# Patient Record
Sex: MALE | Race: BLACK OR AFRICAN AMERICAN | NOT HISPANIC OR LATINO | Employment: OTHER | URBAN - METROPOLITAN AREA
[De-identification: names, ages, dates, MRNs, and addresses within clinical notes are randomized per-mention and may not be internally consistent; named-entity substitution may affect disease eponyms.]

---

## 2023-04-06 LAB
ANION GAP IN SER/PLAS: 8 MMOL/L (ref 10–20)
APPEARANCE, URINE: CLEAR
BILIRUBIN, URINE: NEGATIVE
BLOOD, URINE: NEGATIVE
CALCIUM (MG/DL) IN SER/PLAS: 9.9 MG/DL (ref 8.6–10.3)
CARBON DIOXIDE, TOTAL (MMOL/L) IN SER/PLAS: 29 MMOL/L (ref 21–32)
CHLORIDE (MMOL/L) IN SER/PLAS: 106 MMOL/L (ref 98–107)
COLOR, URINE: YELLOW
CREATININE (MG/DL) IN SER/PLAS: 1.24 MG/DL (ref 0.5–1.3)
CREATININE (MG/DL) IN URINE: 263 MG/DL (ref 20–370)
GFR MALE: 64 ML/MIN/1.73M2
GLUCOSE (MG/DL) IN SER/PLAS: 69 MG/DL (ref 74–99)
GLUCOSE, URINE: NEGATIVE MG/DL
KETONES, URINE: NEGATIVE MG/DL
LEUKOCYTE ESTERASE, URINE: NEGATIVE
NITRITE, URINE: NEGATIVE
PH, URINE: 5 (ref 5–8)
POTASSIUM (MMOL/L) IN SER/PLAS: 4.5 MMOL/L (ref 3.5–5.3)
PROTEIN (MG/DL) IN URINE: 15 MG/DL (ref 5–25)
PROTEIN, URINE: NEGATIVE MG/DL
PROTEIN/CREATININE (MG/MG) IN URINE: 0.06 MG/MG CREAT (ref 0–0.17)
SODIUM (MMOL/L) IN SER/PLAS: 138 MMOL/L (ref 136–145)
SPECIFIC GRAVITY, URINE: 1.03 (ref 1–1.03)
UREA NITROGEN (MG/DL) IN SER/PLAS: 13 MG/DL (ref 6–23)
UROBILINOGEN, URINE: 2 MG/DL (ref 0–1.9)

## 2023-08-25 PROBLEM — R93.1 ELEVATED CORONARY ARTERY CALCIUM SCORE: Status: ACTIVE | Noted: 2023-08-25

## 2023-08-25 PROBLEM — E78.5 DYSLIPIDEMIA: Status: ACTIVE | Noted: 2023-08-25

## 2023-08-25 PROBLEM — I25.10 CAD (CORONARY ARTERY DISEASE): Status: ACTIVE | Noted: 2023-08-25

## 2023-08-25 PROBLEM — N39.0 UTI (URINARY TRACT INFECTION): Status: ACTIVE | Noted: 2023-08-25

## 2023-08-25 PROBLEM — N18.9 CKD (CHRONIC KIDNEY DISEASE): Status: ACTIVE | Noted: 2023-08-25

## 2023-08-25 PROBLEM — R06.02 SHORTNESS OF BREATH ON EXERTION: Status: ACTIVE | Noted: 2023-08-25

## 2023-08-25 PROBLEM — I51.7 LVH (LEFT VENTRICULAR HYPERTROPHY): Status: ACTIVE | Noted: 2023-08-25

## 2023-08-25 PROBLEM — R60.0 LEG EDEMA: Status: ACTIVE | Noted: 2023-08-25

## 2023-08-25 PROBLEM — I10 HYPERTENSION, BENIGN: Status: ACTIVE | Noted: 2023-08-25

## 2023-08-25 PROBLEM — K56.41 FECAL IMPACTION OF COLON (MULTI): Status: ACTIVE | Noted: 2023-08-25

## 2023-08-25 PROBLEM — R32 URINARY INCONTINENCE: Status: ACTIVE | Noted: 2023-08-25

## 2023-08-25 PROBLEM — N45.3 EPIDIDYMOORCHITIS: Status: ACTIVE | Noted: 2023-08-25

## 2023-08-25 PROBLEM — R94.39 ABNORMAL STRESS TEST: Status: ACTIVE | Noted: 2023-08-25

## 2023-08-25 PROBLEM — I48.91 LONE ATRIAL FIBRILLATION (MULTI): Status: ACTIVE | Noted: 2023-08-25

## 2023-08-25 PROBLEM — N31.8 FREQUENCY-URGENCY SYNDROME: Status: ACTIVE | Noted: 2023-08-25

## 2023-08-25 PROBLEM — N40.0 BPH (BENIGN PROSTATIC HYPERPLASIA): Status: ACTIVE | Noted: 2023-08-25

## 2023-08-25 PROBLEM — G47.33 OSA ON CPAP: Status: ACTIVE | Noted: 2023-08-25

## 2023-08-25 RX ORDER — GABAPENTIN 600 MG/1
600 TABLET ORAL 3 TIMES DAILY
COMMUNITY

## 2023-08-25 RX ORDER — LAMOTRIGINE 200 MG/1
1 TABLET ORAL DAILY
COMMUNITY
Start: 2017-04-05

## 2023-08-25 RX ORDER — METFORMIN HYDROCHLORIDE 1000 MG/1
1 TABLET ORAL
COMMUNITY
Start: 2017-12-18

## 2023-08-25 RX ORDER — MIRTAZAPINE 30 MG/1
1 TABLET, FILM COATED ORAL NIGHTLY
COMMUNITY
Start: 2020-12-01

## 2023-08-25 RX ORDER — CLONIDINE HYDROCHLORIDE 0.1 MG/1
0.1 TABLET ORAL 2 TIMES DAILY
COMMUNITY
End: 2023-10-17 | Stop reason: WASHOUT

## 2023-08-25 RX ORDER — NAPROXEN SODIUM 220 MG/1
1 TABLET, FILM COATED ORAL DAILY
COMMUNITY

## 2023-08-25 RX ORDER — OMEPRAZOLE 20 MG/1
1 CAPSULE, DELAYED RELEASE ORAL DAILY
COMMUNITY
Start: 2020-12-01

## 2023-08-25 RX ORDER — INSULIN GLARGINE 100 [IU]/ML
INJECTION, SOLUTION SUBCUTANEOUS
COMMUNITY
End: 2023-10-17 | Stop reason: WASHOUT

## 2023-08-25 RX ORDER — CEPHRADINE 500 MG
CAPSULE ORAL DAILY
COMMUNITY

## 2023-08-25 RX ORDER — HYDROCHLOROTHIAZIDE 25 MG/1
1 TABLET ORAL DAILY
COMMUNITY
End: 2023-10-17 | Stop reason: WASHOUT

## 2023-08-25 RX ORDER — VIT A/VIT C/VIT E/ZINC/COPPER 4296-226
CAPSULE ORAL
COMMUNITY

## 2023-08-25 RX ORDER — SPIRONOLACTONE 25 MG/1
TABLET ORAL 2 TIMES DAILY
COMMUNITY
Start: 2020-12-01 | End: 2023-10-17 | Stop reason: WASHOUT

## 2023-08-25 RX ORDER — HUMAN INSULIN 100 [USP'U]/ML
INJECTION, SUSPENSION SUBCUTANEOUS 2 TIMES DAILY
COMMUNITY
End: 2024-04-17 | Stop reason: WASHOUT

## 2023-08-25 RX ORDER — ATORVASTATIN CALCIUM 80 MG/1
1 TABLET, FILM COATED ORAL NIGHTLY
COMMUNITY
End: 2024-04-17 | Stop reason: SDUPTHER

## 2023-08-25 RX ORDER — FLUTICASONE PROPIONATE 50 MCG
SPRAY, SUSPENSION (ML) NASAL
COMMUNITY
Start: 2021-05-26

## 2023-08-25 RX ORDER — GUANFACINE 2 MG/1
2 TABLET, EXTENDED RELEASE ORAL DAILY
COMMUNITY
End: 2023-10-17 | Stop reason: WASHOUT

## 2023-08-25 RX ORDER — ARTIFICIAL TEARS 1; 2; 3 MG/ML; MG/ML; MG/ML
SOLUTION/ DROPS OPHTHALMIC 4 TIMES DAILY PRN
COMMUNITY

## 2023-08-25 RX ORDER — DILTIAZEM HYDROCHLORIDE EXTENDED-RELEASE TABLETS 240 MG/1
1 TABLET, EXTENDED RELEASE ORAL DAILY
COMMUNITY
End: 2023-10-17 | Stop reason: WASHOUT

## 2023-08-25 RX ORDER — TIZANIDINE 4 MG/1
TABLET ORAL EVERY 8 HOURS PRN
COMMUNITY

## 2023-10-04 ENCOUNTER — APPOINTMENT (OUTPATIENT)
Dept: CARDIOLOGY | Facility: CLINIC | Age: 68
End: 2023-10-04
Payer: COMMERCIAL

## 2023-10-11 ENCOUNTER — HOSPITAL ENCOUNTER (OUTPATIENT)
Dept: CARDIOLOGY | Facility: HOSPITAL | Age: 68
Discharge: HOME | End: 2023-10-11
Payer: COMMERCIAL

## 2023-10-11 DIAGNOSIS — I48.91 ATRIAL FIBRILLATION, UNSPECIFIED TYPE (MULTI): ICD-10-CM

## 2023-10-11 DIAGNOSIS — I48.91 UNSPECIFIED ATRIAL FIBRILLATION (MULTI): ICD-10-CM

## 2023-10-11 DIAGNOSIS — Z95.818 IMPLANTABLE LOOP RECORDER PRESENT: Primary | ICD-10-CM

## 2023-10-11 PROCEDURE — 93298 REM INTERROG DEV EVAL SCRMS: CPT | Performed by: INTERNAL MEDICINE

## 2023-10-13 ENCOUNTER — LAB (OUTPATIENT)
Dept: LAB | Facility: LAB | Age: 68
End: 2023-10-13
Payer: COMMERCIAL

## 2023-10-13 DIAGNOSIS — N18.32 CHRONIC KIDNEY DISEASE, STAGE 3B (MULTI): Primary | ICD-10-CM

## 2023-10-13 DIAGNOSIS — N18.32 CHRONIC KIDNEY DISEASE, STAGE 3B (MULTI): ICD-10-CM

## 2023-10-13 PROBLEM — K21.9 GASTROESOPHAGEAL REFLUX DISEASE WITHOUT ESOPHAGITIS: Status: ACTIVE | Noted: 2018-10-18

## 2023-10-13 PROBLEM — Z86.010 HISTORY OF COLONIC POLYPS: Status: ACTIVE | Noted: 2018-10-18

## 2023-10-13 PROBLEM — Z86.0100 HISTORY OF COLONIC POLYPS: Status: ACTIVE | Noted: 2018-10-18

## 2023-10-13 LAB
ANION GAP SERPL CALC-SCNC: 14 MMOL/L (ref 10–20)
APPEARANCE UR: CLEAR
BILIRUB UR STRIP.AUTO-MCNC: NEGATIVE MG/DL
BUN SERPL-MCNC: 11 MG/DL (ref 6–23)
CALCIUM SERPL-MCNC: 9.7 MG/DL (ref 8.6–10.3)
CHLORIDE SERPL-SCNC: 101 MMOL/L (ref 98–107)
CO2 SERPL-SCNC: 26 MMOL/L (ref 21–32)
COLOR UR: YELLOW
CREAT SERPL-MCNC: 1.08 MG/DL (ref 0.5–1.3)
CREAT UR-MCNC: 184 MG/DL (ref 20–370)
GFR SERPL CREATININE-BSD FRML MDRD: 75 ML/MIN/1.73M*2
GLUCOSE SERPL-MCNC: 298 MG/DL (ref 74–99)
GLUCOSE UR STRIP.AUTO-MCNC: ABNORMAL MG/DL
HYALINE CASTS #/AREA URNS AUTO: ABNORMAL /LPF
KETONES UR STRIP.AUTO-MCNC: NEGATIVE MG/DL
LEUKOCYTE ESTERASE UR QL STRIP.AUTO: NEGATIVE
MUCOUS THREADS #/AREA URNS AUTO: ABNORMAL /LPF
NITRITE UR QL STRIP.AUTO: NEGATIVE
PH UR STRIP.AUTO: 6 [PH]
POTASSIUM SERPL-SCNC: 5.2 MMOL/L (ref 3.5–5.3)
PROT UR STRIP.AUTO-MCNC: ABNORMAL MG/DL
PROT UR-ACNC: 25 MG/DL (ref 5–25)
PROT/CREAT UR: 0.14 MG/MG CREAT (ref 0–0.17)
RBC # UR STRIP.AUTO: NEGATIVE /UL
RBC #/AREA URNS AUTO: ABNORMAL /HPF
SODIUM SERPL-SCNC: 136 MMOL/L (ref 136–145)
SP GR UR STRIP.AUTO: 1.02
UROBILINOGEN UR STRIP.AUTO-MCNC: 2 MG/DL
WBC #/AREA URNS AUTO: ABNORMAL /HPF

## 2023-10-13 PROCEDURE — 81001 URINALYSIS AUTO W/SCOPE: CPT

## 2023-10-13 PROCEDURE — 82570 ASSAY OF URINE CREATININE: CPT

## 2023-10-13 PROCEDURE — 84156 ASSAY OF PROTEIN URINE: CPT

## 2023-10-13 PROCEDURE — 36415 COLL VENOUS BLD VENIPUNCTURE: CPT

## 2023-10-13 PROCEDURE — 80048 BASIC METABOLIC PNL TOTAL CA: CPT

## 2023-10-13 RX ORDER — GUANFACINE 4 MG/1
TABLET, EXTENDED RELEASE ORAL
COMMUNITY
Start: 2023-09-18

## 2023-10-13 RX ORDER — AMIODARONE HYDROCHLORIDE 200 MG/1
200 TABLET ORAL
COMMUNITY
Start: 2023-10-05 | End: 2023-10-17 | Stop reason: SDUPTHER

## 2023-10-13 RX ORDER — DILTIAZEM HYDROCHLORIDE 180 MG/1
CAPSULE, EXTENDED RELEASE ORAL
COMMUNITY
Start: 2023-10-05 | End: 2023-10-17 | Stop reason: SDUPTHER

## 2023-10-13 RX ORDER — APIXABAN 5 MG/1
5 TABLET, FILM COATED ORAL EVERY 12 HOURS
COMMUNITY
Start: 2023-10-05 | End: 2023-10-17 | Stop reason: SDUPTHER

## 2023-10-13 RX ORDER — GABAPENTIN 300 MG/1
CAPSULE ORAL
COMMUNITY
Start: 2023-01-10 | End: 2023-10-17 | Stop reason: WASHOUT

## 2023-10-13 RX ORDER — CLOPIDOGREL BISULFATE 75 MG/1
75 TABLET ORAL
COMMUNITY
Start: 2023-10-05 | End: 2023-10-17 | Stop reason: WASHOUT

## 2023-10-13 RX ORDER — GLUCOSAM/CHON-MSM1/C/MANG/BOSW 500-416.6
TABLET ORAL
COMMUNITY
Start: 2023-07-28

## 2023-10-13 RX ORDER — INSULIN LISPRO 100 [IU]/ML
INJECTION, SUSPENSION SUBCUTANEOUS
COMMUNITY
End: 2023-10-17 | Stop reason: WASHOUT

## 2023-10-13 RX ORDER — DOCUSATE SODIUM 100 MG/1
CAPSULE, LIQUID FILLED ORAL
COMMUNITY
End: 2023-10-17 | Stop reason: WASHOUT

## 2023-10-13 RX ORDER — LIDOCAINE 50 MG/G
PATCH TOPICAL
COMMUNITY
Start: 2023-10-05

## 2023-10-13 RX ORDER — SYRING-NEEDL,DISP,INSUL,0.3 ML 30 GX5/16"
SYRINGE, EMPTY DISPOSABLE MISCELLANEOUS
COMMUNITY
Start: 2022-11-21

## 2023-10-13 RX ORDER — OXYCODONE HYDROCHLORIDE 5 MG/1
TABLET ORAL
COMMUNITY
Start: 2023-10-05 | End: 2024-04-17 | Stop reason: WASHOUT

## 2023-10-13 RX ORDER — LANCETS 30 GAUGE
EACH MISCELLANEOUS
COMMUNITY
Start: 2023-01-17

## 2023-10-13 RX ORDER — PEN NEEDLE, DIABETIC 31 GX5/16"
NEEDLE, DISPOSABLE MISCELLANEOUS
COMMUNITY
Start: 2023-08-02

## 2023-10-13 RX ORDER — BLOOD SUGAR DIAGNOSTIC
STRIP MISCELLANEOUS
COMMUNITY

## 2023-10-13 RX ORDER — BUPRENORPHINE HCL 300 MCG
FILM, MEDICATED (EA) BUCCAL EVERY 12 HOURS
COMMUNITY
Start: 2022-02-28 | End: 2023-10-17 | Stop reason: WASHOUT

## 2023-10-17 ENCOUNTER — OFFICE VISIT (OUTPATIENT)
Dept: CARDIOLOGY | Facility: CLINIC | Age: 68
End: 2023-10-17
Payer: COMMERCIAL

## 2023-10-17 VITALS
HEART RATE: 75 BPM | BODY MASS INDEX: 28.36 KG/M2 | DIASTOLIC BLOOD PRESSURE: 64 MMHG | WEIGHT: 221 LBS | HEIGHT: 74 IN | SYSTOLIC BLOOD PRESSURE: 110 MMHG | OXYGEN SATURATION: 99 %

## 2023-10-17 DIAGNOSIS — R93.1 ELEVATED CORONARY ARTERY CALCIUM SCORE: ICD-10-CM

## 2023-10-17 DIAGNOSIS — I48.0 PAROXYSMAL ATRIAL FIBRILLATION (MULTI): ICD-10-CM

## 2023-10-17 DIAGNOSIS — I25.10 CORONARY ARTERY DISEASE INVOLVING NATIVE CORONARY ARTERY OF NATIVE HEART WITHOUT ANGINA PECTORIS: Primary | ICD-10-CM

## 2023-10-17 DIAGNOSIS — I10 HYPERTENSION, BENIGN: ICD-10-CM

## 2023-10-17 PROBLEM — I48.91 LONE ATRIAL FIBRILLATION (MULTI): Status: RESOLVED | Noted: 2023-08-25 | Resolved: 2023-10-17

## 2023-10-17 PROCEDURE — 3074F SYST BP LT 130 MM HG: CPT | Performed by: INTERNAL MEDICINE

## 2023-10-17 PROCEDURE — 93000 ELECTROCARDIOGRAM COMPLETE: CPT | Performed by: INTERNAL MEDICINE

## 2023-10-17 PROCEDURE — 99215 OFFICE O/P EST HI 40 MIN: CPT | Performed by: INTERNAL MEDICINE

## 2023-10-17 PROCEDURE — 1159F MED LIST DOCD IN RCRD: CPT | Performed by: INTERNAL MEDICINE

## 2023-10-17 PROCEDURE — 3078F DIAST BP <80 MM HG: CPT | Performed by: INTERNAL MEDICINE

## 2023-10-17 PROCEDURE — 1036F TOBACCO NON-USER: CPT | Performed by: INTERNAL MEDICINE

## 2023-10-17 RX ORDER — DILTIAZEM HYDROCHLORIDE 180 MG/1
180 CAPSULE, EXTENDED RELEASE ORAL DAILY
Qty: 90 CAPSULE | Refills: 3 | Status: SHIPPED | OUTPATIENT
Start: 2023-10-17 | End: 2024-04-17 | Stop reason: SDUPTHER

## 2023-10-17 RX ORDER — APIXABAN 5 MG/1
5 TABLET, FILM COATED ORAL 2 TIMES DAILY
Qty: 180 TABLET | Refills: 3 | Status: SHIPPED | OUTPATIENT
Start: 2023-10-17 | End: 2024-04-17 | Stop reason: SDUPTHER

## 2023-10-17 RX ORDER — AMIODARONE HYDROCHLORIDE 200 MG/1
200 TABLET ORAL
Qty: 90 TABLET | Refills: 3 | Status: SHIPPED | OUTPATIENT
Start: 2023-10-17 | End: 2024-04-17 | Stop reason: SDUPTHER

## 2023-10-17 NOTE — PROGRESS NOTES
"Chief Complaint:   Hospital Follow-up     History Of Present Illness:    Quinn Stokes is a 68 y.o. male with h/o HTN, LVH, ? Afib (not on OAC), DM, AZAEL on CPAP, CKD, BPH with urinary frequency/incontinence who presents for follow up.    Recent hospital admission 7/2023 for SBO s/p explap and MARTÍN.  He had another hospital admission at [facility] after back surgery, discharged one week ago to a rehab facility > now at home.  Tells me during the last hospitalization he was started on Eliquis and amiodarone due to \"irregular heart rhythm\" that they saw.  Also takes diltiazem HCL ER 180mg daily (instead of 240mg daily).    Upon review of [employer] loop recorder device report (not finalized yet) from 10/11/23 - detected a 2.7% burden of Afib, with avg/max HR's in the 170-180's.    Denies palpitations or irreg HR/tachycardia now or when he was hospitalized.  No chest pain, pressure, no SOB, LH/dizziness, no syncope or presyncope.  Since being on eliquis he denies hematochezia/melena, hematuria.    He did have a recent fall in 9/2023 - right leg pain - improved with PT initially but then recurred - this prompted an MRI of his back and then led to his recent back surgery (fusion lumbar spine)    Per his last discharge med list, his spironolactone 25mg bid, hydrochlorothiazide 25mg daily, and clonidine 0.1mg bid were discontinued.  He was initiated on diltiazem HCL ER 180mg daily (reduced from 240mg daily).    BP today 110/64 - reportedly BP's have been good at his recent surgeon and PCP appointments.    Prior history:  ~ 15 yrs ago he was started on medication to help \"preserve his kidneys.\" He was started on vasotec > lisinopril but had allergies to these. He was then put on diltiazem once a day which he took for many years. He saw a nephrologist and was switched to extended release diltiazem.     AZAEL > uses CPAP every night and is awaiting a new sleep study for titration.     Had an episode of Afib in 2009 - " "had a \"sensation in my chest,\" he was diagnosed with atrial fibrillation at that time - he was started on a medication which he does not recall but which tasted like metal. Subsequent EKG's since then demonstrated normal sinus rhythm. He saw a cardiologist a year afterward and was taken off of that medication. Since then no episodes of Afib, no EKG's with Afib.     At home his BP's have been running 145/88 most of the time, occasionally it is 160/99.      Last Recorded Vitals:  Vitals:    10/17/23 1002   BP: 110/64   BP Location: Right arm   Patient Position: Sitting   BP Cuff Size: Adult   Pulse: 75   SpO2: 99%   Weight: 100 kg (221 lb)   Height: 1.88 m (6' 2\")       Past Medical History:  He has a past medical history of Anxiety disorder, unspecified, Depression, unspecified, Personal history of other diseases of the digestive system, Personal history of other diseases of the digestive system, Personal history of other diseases of the musculoskeletal system and connective tissue, Personal history of other diseases of the musculoskeletal system and connective tissue, Personal history of other diseases of urinary system, Personal history of other endocrine, nutritional and metabolic disease (02/16/2021), Personal history of other endocrine, nutritional and metabolic disease, Personal history of other infectious and parasitic diseases, Post-traumatic stress disorder, unspecified, and Type 2 diabetes mellitus without complications (CMS/HCC).    Past Surgical History:  He has a past surgical history that includes Adenoidectomy (03/19/2018); Tonsillectomy (03/19/2018); Other surgical history (12/01/2020); and CT angio coronary art with heartflow if score >30% (4/2/2021).      Social History:  He reports that he has never smoked. He has never used smokeless tobacco. No history on file for alcohol use and drug use.    Family History:  Family History   Problem Relation Name Age of Onset    Cancer Mother      Hypertension " "Mother      Other (cerebrovascular) Maternal Grandmother      Hypertension Maternal Grandmother      Other (cerebrovascular) Paternal Grandmother      Hypertension Paternal Grandmother      Cancer Other aunt     Other (cardiac disorder) Other      Diabetes Other      Kidney disease Other      Cancer Other      Prostate cancer Other      Thyroid disease Other      Hypertension Other      Stroke Other          Allergies:  Carbamazepine, Ace inhibitors, and Aripiprazole    Outpatient Medications:  Current Outpatient Medications   Medication Instructions    amiodarone (PACERONE) 200 mg, oral, Daily RT    artificial tears, dextran-hypomel-glycerin, 0.1-0.3-0.2 % ophthalmic solution ophthalmic (eye), 4 times daily PRN    aspirin 81 mg chewable tablet 1 tablet, oral, Daily    atorvastatin (Lipitor) 80 mg tablet 1 tablet, oral, Nightly    BD Ultra-Fine Mini Pen Needle 31 gauge x 3/16\" needle USE WITH INSULIN TWICE DAILY    buprenorphine (Belbuca) 300 mcg buccal film Every 12 hours    cholecalciferol, vitamin D3, 250 mcg (10,000 unit) capsule oral, Daily    cloNIDine (CATAPRES) 0.1 mg, oral, 2 times daily    clopidogrel (PLAVIX) 75 mg, oral, Daily RT    dilTIAZem ER (Tiazac) 180 mg 24 hr capsule     dilTIAZem LA (Cardizem LA) 240 mg 24 hr tablet 1 tablet, oral, Daily    docusate sodium (Colace) 100 mg capsule 1 capsule as needed    Eliquis 5 mg, oral, Every 12 hours    fluticasone (Flonase) 50 mcg/actuation nasal spray nasal    gabapentin (Neurontin) 300 mg capsule TAKE TWO CAPSULES BY MOUTH FOUR TIMES DAILY    gabapentin (NEURONTIN) 600 mg, oral, 3 times daily    guanFACINE (Intuniv) 4 mg 24 hr tablet     guanFACINE (INTUNIV) 2 mg, oral, Daily    HumaLOG Mix 75-25 KwikPen 100 unit/mL (75-25) injection Inject 40 units before breakfast and 35 units before dinner Subcutaneous twice daily for 90 days    hydroCHLOROthiazide (HYDRODiuril) 25 mg tablet 1 tablet, oral, Daily    insulin glargine (Lantus U-100 Insulin) 100 unit/mL " injection subcutaneous    insulin NPH and regular human (NovoLIN 70/30 U-100 Insulin) 100 unit/mL (70-30) injection subcutaneous, 2 times daily    lamoTRIgine (LaMICtal) 200 mg tablet 1 tablet, oral, Daily    lidocaine (Lidoderm) 5 % patch Topical    linaCLOtide (Linzess) 145 mcg capsule oral    metFORMIN (Glucophage) 1,000 mg tablet 1 tablet, oral, 2 times daily with meals    mirtazapine (Remeron) 30 mg tablet 1 tablet, oral, Nightly    omeprazole (PriLOSEC) 20 mg DR capsule 1 capsule, oral, Daily    OneTouch Ultra Test strip USE TO TEST BLOOD SUGAR TWICE DAILY AS DIRECTED    OneTouch Ultra2 Meter misc USE THREE TIMES DAILY AS DIRECTED    oxyCODONE (Roxicodone) 5 mg immediate release tablet Take one tablet by mouth every six hours as needed for severe pain.    spironolactone (Aldactone) 25 mg tablet oral, 2 times daily    tiZANidine (Zanaflex) 4 mg tablet oral, Every 8 hours PRN    TRUEplus Insulin 1 mL 30 gauge x 5/16 syringe USE ONCE DAILY WITH INSULIN    TRUEplus Lancets 28 gauge USE TWICE DAILY AS DIRECTED    vitamin D3-folic acid 125 mcg (5,000 unit)-1 mg tablet oral    vitamins A,C,E-zinc-copper (PreserVision AREDS) 4,296 mcg-226 mg-90 mg capsule oral       Physical Exam:  Physical Exam  HENT:      Head: Normocephalic.      Nose: Nose normal.      Mouth/Throat:      Mouth: Mucous membranes are moist.   Eyes:      Pupils: Pupils are equal, round, and reactive to light.   Cardiovascular:      Rate and Rhythm: Normal rate and regular rhythm.      Comments: 1/6 systolic murmur RUSB   Pulmonary:      Effort: Pulmonary effort is normal.      Comments: Decreased breath sounds in right base posterior  Abdominal:      Palpations: Abdomen is soft.   Musculoskeletal:         General: Normal range of motion.   Skin:     General: Skin is warm and dry.   Neurological:      Mental Status: He is alert. Mental status is at baseline.   Psychiatric:         Mood and Affect: Mood normal.             Last Labs:  CBC -  Lab  Results   Component Value Date    WBC 6.1 07/15/2023    HGB 11.6 (L) 07/15/2023    HCT 36.7 (L) 07/15/2023    MCV 86 07/15/2023     07/15/2023       CMP -  Lab Results   Component Value Date    CALCIUM 9.7 10/13/2023    PHOS CANCELED 07/16/2023    PROT 8.2 07/13/2023    ALBUMIN 3.8 07/14/2023    AST 39 07/13/2023    ALT 26 07/13/2023    ALKPHOS 69 07/13/2023    BILITOT 0.9 07/13/2023       LIPID PANEL -   Lab Results   Component Value Date    CHOL 172 09/22/2022    TRIG 305 (H) 09/22/2022    HDL 29.6 (A) 09/22/2022    CHHDL 5.8 (A) 09/22/2022    LDLF 81 09/22/2022    VLDL 61 (H) 09/22/2022    NHDL 142 09/22/2022       RENAL FUNCTION PANEL -   Lab Results   Component Value Date    GLUCOSE 298 (H) 10/13/2023     10/13/2023    K 5.2 10/13/2023     10/13/2023    CO2 26 10/13/2023    ANIONGAP 14 10/13/2023    BUN 11 10/13/2023    CREATININE 1.08 10/13/2023    GFRMALE CANCELED 07/16/2023    CALCIUM 9.7 10/13/2023    PHOS CANCELED 07/16/2023    ALBUMIN 3.8 07/14/2023        Lab Results   Component Value Date    HGBA1C 7.9 (A) 07/14/2023       Last Cardiology Tests:  ECG:  ECG 12 Lead 10/17/2023      Assessment/Plan   pAfib:  had lone Afib, has had [employer] loop recorder.  Afib during last hospitalization also seen on Linq report 10/11/23.  -c/w current dose diltiazem 180mg daily for rate control  -on amiodarone maintenance dose 200mg daily - will need to have TSH/CXR at next visit  -keep loop in for another 3 mos to examine burden of Afib  -if no further Afib at next check, recommend we remove Linq  -eliquis 5mg bid  -b/c of amio use, recommended that patient reduce doses of both tizanidine and guanfacine    HTN:  Reasonable in office today  -continue current dose diltiazem 180mg daily  -ok to stay off hellen, clonidine, hydrochlorothiazide  -serial BP monitoring    Elevated CAC Score:  no current angina  -ASA  -atorvastatin 80mg daily    LVH:  -BP control      Rigoberto Herrera MD

## 2023-10-17 NOTE — PATIENT INSTRUCTIONS
Thanks for following up in office today.    1)  You are on amiodarone, a medicine that keeps you in normal rhythm  Being on this medicine raises the levels of tizanidine and guanfacine - I want you to discuss with your primary doctor about reducing dose of guanfacine accordingly, and contact your orthopedic surgeon to reduce the dose of tizanidine accordingly.    2)  I renewed your medications    3)  Please continue your cardiac medications as prescribed.    Follow up with ur in 6 mos  If you have any questions, please call [phone] and choose option for Dr. Herrera's nurse Belgica Srivastava

## 2023-12-07 ENCOUNTER — HOSPITAL ENCOUNTER (OUTPATIENT)
Dept: CARDIOLOGY | Facility: HOSPITAL | Age: 68
Discharge: HOME | End: 2023-12-07
Payer: COMMERCIAL

## 2023-12-07 DIAGNOSIS — Z95.818 IMPLANTABLE LOOP RECORDER PRESENT: ICD-10-CM

## 2023-12-07 DIAGNOSIS — I48.91 ATRIAL FIBRILLATION, UNSPECIFIED TYPE (MULTI): ICD-10-CM

## 2023-12-07 DIAGNOSIS — I48.91 ATRIAL FIBRILLATION, UNSPECIFIED TYPE (MULTI): Primary | ICD-10-CM

## 2024-03-07 ENCOUNTER — HOSPITAL ENCOUNTER (OUTPATIENT)
Dept: CARDIOLOGY | Facility: HOSPITAL | Age: 69
Discharge: HOME | End: 2024-03-07
Payer: COMMERCIAL

## 2024-03-07 DIAGNOSIS — I48.91 ATRIAL FIBRILLATION, UNSPECIFIED TYPE (MULTI): ICD-10-CM

## 2024-03-07 DIAGNOSIS — Z95.818 IMPLANTABLE LOOP RECORDER PRESENT: ICD-10-CM

## 2024-03-07 PROCEDURE — 93298 REM INTERROG DEV EVAL SCRMS: CPT

## 2024-03-07 PROCEDURE — 93298 REM INTERROG DEV EVAL SCRMS: CPT | Performed by: INTERNAL MEDICINE

## 2024-04-04 ENCOUNTER — LAB (OUTPATIENT)
Dept: LAB | Facility: LAB | Age: 69
End: 2024-04-04
Payer: COMMERCIAL

## 2024-04-04 DIAGNOSIS — I10 ESSENTIAL (PRIMARY) HYPERTENSION: ICD-10-CM

## 2024-04-04 DIAGNOSIS — I10 ESSENTIAL (PRIMARY) HYPERTENSION: Primary | ICD-10-CM

## 2024-04-04 LAB
25(OH)D3 SERPL-MCNC: 46 NG/ML (ref 30–100)
ALBUMIN SERPL BCP-MCNC: 3.9 G/DL (ref 3.4–5)
ANION GAP SERPL CALC-SCNC: 11 MMOL/L (ref 10–20)
BUN SERPL-MCNC: 13 MG/DL (ref 6–23)
CALCIUM SERPL-MCNC: 9.2 MG/DL (ref 8.6–10.3)
CHLORIDE SERPL-SCNC: 107 MMOL/L (ref 98–107)
CO2 SERPL-SCNC: 28 MMOL/L (ref 21–32)
CREAT SERPL-MCNC: 1.03 MG/DL (ref 0.5–1.3)
CREAT UR-MCNC: 180.5 MG/DL (ref 20–370)
EGFRCR SERPLBLD CKD-EPI 2021: 79 ML/MIN/1.73M*2
ERYTHROCYTE [DISTWIDTH] IN BLOOD BY AUTOMATED COUNT: 16.3 % (ref 11.5–14.5)
GLUCOSE SERPL-MCNC: 136 MG/DL (ref 74–99)
HCT VFR BLD AUTO: 40.3 % (ref 41–52)
HGB BLD-MCNC: 12.3 G/DL (ref 13.5–17.5)
MCH RBC QN AUTO: 25 PG (ref 26–34)
MCHC RBC AUTO-ENTMCNC: 30.5 G/DL (ref 32–36)
MCV RBC AUTO: 82 FL (ref 80–100)
NRBC BLD-RTO: 0 /100 WBCS (ref 0–0)
PHOSPHATE SERPL-MCNC: 3.1 MG/DL (ref 2.5–4.9)
PLATELET # BLD AUTO: 252 X10*3/UL (ref 150–450)
POTASSIUM SERPL-SCNC: 4.6 MMOL/L (ref 3.5–5.3)
PROT UR-ACNC: 47 MG/DL (ref 5–25)
PROT/CREAT UR: 0.26 MG/MG CREAT (ref 0–0.17)
PTH-INTACT SERPL-MCNC: 41 PG/ML (ref 18.5–88)
RBC # BLD AUTO: 4.92 X10*6/UL (ref 4.5–5.9)
SODIUM SERPL-SCNC: 141 MMOL/L (ref 136–145)
WBC # BLD AUTO: 6.3 X10*3/UL (ref 4.4–11.3)

## 2024-04-04 PROCEDURE — 84156 ASSAY OF PROTEIN URINE: CPT

## 2024-04-04 PROCEDURE — 80069 RENAL FUNCTION PANEL: CPT

## 2024-04-04 PROCEDURE — 83970 ASSAY OF PARATHORMONE: CPT

## 2024-04-04 PROCEDURE — 36415 COLL VENOUS BLD VENIPUNCTURE: CPT

## 2024-04-04 PROCEDURE — 82306 VITAMIN D 25 HYDROXY: CPT

## 2024-04-04 PROCEDURE — 85027 COMPLETE CBC AUTOMATED: CPT

## 2024-04-04 PROCEDURE — 82570 ASSAY OF URINE CREATININE: CPT

## 2024-04-15 PROBLEM — M54.50 LOW BACK PAIN, UNSPECIFIED: Status: ACTIVE | Noted: 2023-08-22

## 2024-04-15 PROBLEM — Z20.822 CONTACT WITH AND (SUSPECTED) EXPOSURE TO COVID-19: Status: ACTIVE | Noted: 2023-01-16

## 2024-04-15 PROBLEM — Z86.19 HISTORY OF HEPATITIS C VIRUS INFECTION: Status: ACTIVE | Noted: 2023-07-16

## 2024-04-15 PROBLEM — S37.009A INJURY OF KIDNEY: Status: ACTIVE | Noted: 2023-07-16

## 2024-04-15 PROBLEM — N39.0 URINARY TRACT INFECTION: Status: ACTIVE | Noted: 2024-04-15

## 2024-04-15 PROBLEM — Z95.818 IMPLANTABLE LOOP RECORDER PRESENT: Status: ACTIVE | Noted: 2024-02-16

## 2024-04-15 PROBLEM — S91.019A LACERATION OF ANKLE: Status: ACTIVE | Noted: 2024-04-15

## 2024-04-15 PROBLEM — G92.8 OTHER TOXIC ENCEPHALOPATHY: Status: ACTIVE | Noted: 2023-07-16

## 2024-04-15 PROBLEM — D62 ACUTE POSTHEMORRHAGIC ANEMIA: Status: ACTIVE | Noted: 2023-10-24

## 2024-04-15 PROBLEM — F43.10 POSTTRAUMATIC STRESS DISORDER: Status: ACTIVE | Noted: 2023-01-16

## 2024-04-15 PROBLEM — K59.00 CONSTIPATION: Status: ACTIVE | Noted: 2023-01-16

## 2024-04-15 PROBLEM — F32.A DEPRESSIVE DISORDER: Status: ACTIVE | Noted: 2023-09-13

## 2024-04-15 PROBLEM — Z86.39 HISTORY OF METABOLIC DISORDER: Status: ACTIVE | Noted: 2024-04-15

## 2024-04-15 PROBLEM — E87.5 HYPERKALEMIA: Status: ACTIVE | Noted: 2023-07-16

## 2024-04-15 PROBLEM — R10.9 ABDOMINAL PAIN: Status: ACTIVE | Noted: 2023-01-16

## 2024-04-15 PROBLEM — E11.9 TYPE 2 DIABETES MELLITUS (MULTI): Status: ACTIVE | Noted: 2023-07-16

## 2024-04-15 RX ORDER — INSULIN LISPRO 100 [IU]/ML
INJECTION, SUSPENSION SUBCUTANEOUS
COMMUNITY

## 2024-04-17 ENCOUNTER — LAB (OUTPATIENT)
Dept: LAB | Facility: LAB | Age: 69
End: 2024-04-17
Payer: COMMERCIAL

## 2024-04-17 ENCOUNTER — OFFICE VISIT (OUTPATIENT)
Dept: CARDIOLOGY | Facility: CLINIC | Age: 69
End: 2024-04-17
Payer: COMMERCIAL

## 2024-04-17 ENCOUNTER — HOSPITAL ENCOUNTER (OUTPATIENT)
Dept: RADIOLOGY | Facility: HOSPITAL | Age: 69
Discharge: HOME | End: 2024-04-17
Payer: COMMERCIAL

## 2024-04-17 VITALS
HEART RATE: 86 BPM | SYSTOLIC BLOOD PRESSURE: 120 MMHG | DIASTOLIC BLOOD PRESSURE: 78 MMHG | WEIGHT: 227 LBS | BODY MASS INDEX: 29.13 KG/M2 | HEIGHT: 74 IN

## 2024-04-17 DIAGNOSIS — R93.1 ELEVATED CORONARY ARTERY CALCIUM SCORE: ICD-10-CM

## 2024-04-17 DIAGNOSIS — I48.0 PAROXYSMAL ATRIAL FIBRILLATION (MULTI): ICD-10-CM

## 2024-04-17 DIAGNOSIS — I10 HYPERTENSION, BENIGN: ICD-10-CM

## 2024-04-17 DIAGNOSIS — I25.10 CORONARY ARTERY DISEASE INVOLVING NATIVE CORONARY ARTERY OF NATIVE HEART WITHOUT ANGINA PECTORIS: ICD-10-CM

## 2024-04-17 DIAGNOSIS — Z79.899 LONG TERM CURRENT USE OF ANTIARRHYTHMIC DRUG: Primary | ICD-10-CM

## 2024-04-17 LAB — TSH SERPL-ACNC: 0.9 MIU/L (ref 0.44–3.98)

## 2024-04-17 PROCEDURE — 1159F MED LIST DOCD IN RCRD: CPT | Performed by: INTERNAL MEDICINE

## 2024-04-17 PROCEDURE — 84443 ASSAY THYROID STIM HORMONE: CPT

## 2024-04-17 PROCEDURE — 71046 X-RAY EXAM CHEST 2 VIEWS: CPT

## 2024-04-17 PROCEDURE — 36415 COLL VENOUS BLD VENIPUNCTURE: CPT

## 2024-04-17 PROCEDURE — 3078F DIAST BP <80 MM HG: CPT | Performed by: INTERNAL MEDICINE

## 2024-04-17 PROCEDURE — 3074F SYST BP LT 130 MM HG: CPT | Performed by: INTERNAL MEDICINE

## 2024-04-17 PROCEDURE — 93000 ELECTROCARDIOGRAM COMPLETE: CPT | Performed by: INTERNAL MEDICINE

## 2024-04-17 PROCEDURE — 71046 X-RAY EXAM CHEST 2 VIEWS: CPT | Performed by: RADIOLOGY

## 2024-04-17 PROCEDURE — 99215 OFFICE O/P EST HI 40 MIN: CPT | Performed by: INTERNAL MEDICINE

## 2024-04-17 PROCEDURE — 1036F TOBACCO NON-USER: CPT | Performed by: INTERNAL MEDICINE

## 2024-04-17 PROCEDURE — 3060F POS MICROALBUMINURIA REV: CPT | Performed by: INTERNAL MEDICINE

## 2024-04-17 RX ORDER — DILTIAZEM HYDROCHLORIDE 180 MG/1
180 CAPSULE, EXTENDED RELEASE ORAL DAILY
Qty: 90 CAPSULE | Refills: 3 | Status: SHIPPED | OUTPATIENT
Start: 2024-04-17 | End: 2025-04-17

## 2024-04-17 RX ORDER — ATORVASTATIN CALCIUM 80 MG/1
80 TABLET, FILM COATED ORAL NIGHTLY
Qty: 90 TABLET | Refills: 3 | Status: SHIPPED | OUTPATIENT
Start: 2024-04-17 | End: 2025-04-17

## 2024-04-17 RX ORDER — APIXABAN 5 MG/1
5 TABLET, FILM COATED ORAL 2 TIMES DAILY
Qty: 180 TABLET | Refills: 3 | Status: SHIPPED | OUTPATIENT
Start: 2024-04-17 | End: 2025-04-17

## 2024-04-17 RX ORDER — AMIODARONE HYDROCHLORIDE 200 MG/1
200 TABLET ORAL
Qty: 90 TABLET | Refills: 3 | Status: SHIPPED | OUTPATIENT
Start: 2024-04-17 | End: 2025-04-17

## 2024-04-17 NOTE — PATIENT INSTRUCTIONS
Thanks for following up in office today.    1)  I am going to order a TSH and chest xray for you since you are on the amiodarone.    2)  Ou need to let me know if your doses of guanfacine or tizanidine are changed.    3)  Please continue your cardiac medications as prescribed.  I have sent in refills for your medications.    4) You are asking to have the loop recorder removed.  We have messaged Dr Cotton's  to have this scheduled.  Follow up with Toshia NP in 4 months  If you have any questions, please call [phone] and choose option for Dr. Herrera's nurse Belgica Srivastava

## 2024-04-17 NOTE — PROGRESS NOTES
"Chief Complaint:   No chief complaint on file.     History Of Present Illness:    Quinn Stokes is a 69 y.o. male presenting for 6 month follow up   with h/o ASHD (cath 2021with mild-mod CAD (mod severe OM1 lesion - med mgt), HTN, LVH, Afib seen on loop recorder in 9/2023 now on OAC with eliquis, DM, AZAEL on CPAP, CKD, BPH with urinary frequency/incontinence who presents for follow up.     Doing well from a CV perspective.  No chest pain/pressure.  Walks as much as possible.  No LE edema, orthopnea, PND.  No palps, LH/dizziness, syncope.    He unfortunately had the loss of two loved ones lately (best friend and son).  States he is grieving but doing ok.    ROS:  The remainder of the review of systems was obtained, as was negative as pertains to the chief complaint.    CV testing reviewed :  BMP 4/2024  K 4.6  BUN 13  crea 1.03    Loop recorder device report 3/2024  no report in chart last one scanned in chart 12/2023  no AT/AF since 9/2023    Lipid labs 9/2022  chol 172  HDL 29.6  LDL 81  trig 305    LHC   5/2021    CONCLUSIONS:   1. Heavily calcified coronary arteries with mild-moderate nonobstructive disease.   2. The OM1 demonstrated a mod-severe calcified mid lesion. RFR was performed but was considered to be unreliable to pressure drift. No PCI was performed.   3. No significant LV-AO peak to peak gradient.   4. Left Ventricular end-diastolic pressure = 26.    Stress MPI 3/2021  IMPRESSION:  1. Normal stress myocardial perfusion imaging in response to  pharmacologic stress. Mild left ventricular systolic dysfunction on  post stress gated imaging.  TTE 11/2020   EF 60-65% ;eft atrium mildly dilated, aortic root is abnormal mild dilatation of the ascending aorta     Prior history:  ~ 15 yrs ago he was started on medication to help \"preserve his kidneys.\" He was started on vasotec > lisinopril but had allergies to these. He was then put on diltiazem once a day which he took for many years. He saw a " "nephrologist and was switched to extended release diltiazem.  Had an episode of Afib in 2009 - had a \"sensation in my chest,\" he was diagnosed with atrial fibrillation at that time - he was started on a medication which he does not recall but which tasted like metal. Subsequent EKG's since then demonstrated normal sinus rhythm. He saw a cardiologist a year afterward and was taken off of that medication. Since then no episodes of Afib, no EKG's with Afib.     Last Recorded Vitals:  Vitals:    04/17/24 1449   Weight: 100 kg (221 lb)   Height: 1.88 m (6' 2\")       Past Medical History:  He has a past medical history of Anxiety disorder, unspecified, Depression, unspecified, Personal history of other diseases of the digestive system, Personal history of other diseases of the digestive system, Personal history of other diseases of the musculoskeletal system and connective tissue, Personal history of other diseases of the musculoskeletal system and connective tissue, Personal history of other diseases of urinary system, Personal history of other endocrine, nutritional and metabolic disease (02/16/2021), Personal history of other endocrine, nutritional and metabolic disease, Personal history of other infectious and parasitic diseases, Post-traumatic stress disorder, unspecified, and Type 2 diabetes mellitus without complications (Multi).    Past Surgical History:  He has a past surgical history that includes Adenoidectomy (03/19/2018); Tonsillectomy (03/19/2018); Other surgical history (12/01/2020); and CT angio coronary art with heartflow if score >30% (4/2/2021).      Social History:  He reports that he has never smoked. He has never used smokeless tobacco. No history on file for alcohol use and drug use.    Family History:  Family History   Problem Relation Name Age of Onset    Cancer Mother      Hypertension Mother      Other (cerebrovascular) Maternal Grandmother      Hypertension Maternal Grandmother      Other " "(cerebrovascular) Paternal Grandmother      Hypertension Paternal Grandmother      Cancer Other aunt     Other (cardiac disorder) Other      Diabetes Other      Kidney disease Other      Cancer Other      Prostate cancer Other      Thyroid disease Other      Hypertension Other      Stroke Other          Allergies:  Carbamazepine, Ace inhibitors, and Aripiprazole    Outpatient Medications:  Current Outpatient Medications   Medication Instructions    amiodarone (PACERONE) 200 mg, oral, Daily RT    artificial tears, dextran-hypomel-glycerin, 0.1-0.3-0.2 % ophthalmic solution ophthalmic (eye), 4 times daily PRN    aspirin 81 mg chewable tablet 1 tablet, oral, Daily    atorvastatin (Lipitor) 80 mg tablet 1 tablet, oral, Nightly    BD Ultra-Fine Mini Pen Needle 31 gauge x 3/16\" needle USE WITH INSULIN TWICE DAILY    cholecalciferol, vitamin D3, 250 mcg (10,000 unit) capsule oral, Daily    dilTIAZem ER (TIAZAC) 180 mg, oral, Daily    Eliquis 5 mg, oral, 2 times daily    fluticasone (Flonase) 50 mcg/actuation nasal spray nasal    gabapentin (NEURONTIN) 600 mg, oral, 3 times daily    guanFACINE (Intuniv) 4 mg 24 hr tablet     HumaLOG Mix 75-25 KwikPen 100 unit/mL (75-25) injection subcutaneous, 2 times daily with meals    insulin NPH and regular human (NovoLIN 70/30 U-100 Insulin) 100 unit/mL (70-30) injection subcutaneous, 2 times daily    lamoTRIgine (LaMICtal) 200 mg tablet 1 tablet, oral, Daily    lidocaine (Lidoderm) 5 % patch Topical    linaCLOtide (Linzess) 145 mcg capsule oral    metFORMIN (Glucophage) 1,000 mg tablet 1 tablet, oral, 2 times daily with meals    mirtazapine (Remeron) 30 mg tablet 1 tablet, oral, Nightly    omeprazole (PriLOSEC) 20 mg DR capsule 1 capsule, oral, Daily    OneTouch Ultra Test strip USE TO TEST BLOOD SUGAR TWICE DAILY AS DIRECTED    OneTouch Ultra2 Meter misc USE THREE TIMES DAILY AS DIRECTED    oxyCODONE (Roxicodone) 5 mg immediate release tablet Take one tablet by mouth every six hours " as needed for severe pain.    tiZANidine (Zanaflex) 4 mg tablet oral, Every 8 hours PRN    TRUEplus Insulin 1 mL 30 gauge x 5/16 syringe USE ONCE DAILY WITH INSULIN    TRUEplus Lancets 28 gauge USE TWICE DAILY AS DIRECTED    vitamins A,C,E-zinc-copper (PreserVision AREDS) 4,296 mcg-226 mg-90 mg capsule oral       Physical Exam:  Physical Exam  HENT:      Head: Normocephalic.      Nose: Nose normal.      Mouth/Throat:      Mouth: Mucous membranes are moist.   Cardiovascular:      Rate and Rhythm: Normal rate and regular rhythm.   Pulmonary:      Effort: Pulmonary effort is normal.      Breath sounds: Normal breath sounds.   Abdominal:      Palpations: Abdomen is soft.   Musculoskeletal:         General: Normal range of motion.      Cervical back: Normal range of motion.   Skin:     General: Skin is warm and dry.   Neurological:      General: No focal deficit present.      Mental Status: He is alert.   Psychiatric:         Mood and Affect: Mood normal.          Last Labs:  CBC -  Lab Results   Component Value Date    WBC 6.3 04/04/2024    HGB 12.3 (L) 04/04/2024    HCT 40.3 (L) 04/04/2024    MCV 82 04/04/2024     04/04/2024       CMP -  Lab Results   Component Value Date    CALCIUM 9.2 04/04/2024    PHOS 3.1 04/04/2024    PROT 8.2 07/13/2023    ALBUMIN 3.9 04/04/2024    AST 39 07/13/2023    ALT 26 07/13/2023    ALKPHOS 69 07/13/2023    BILITOT 0.9 07/13/2023       LIPID PANEL -   Lab Results   Component Value Date    CHOL 172 09/22/2022    TRIG 305 (H) 09/22/2022    HDL 29.6 (A) 09/22/2022    CHHDL 5.8 (A) 09/22/2022    LDLF 81 09/22/2022    VLDL 61 (H) 09/22/2022    NHDL 142 09/22/2022       RENAL FUNCTION PANEL -   Lab Results   Component Value Date    GLUCOSE 136 (H) 04/04/2024     04/04/2024    K 4.6 04/04/2024     04/04/2024    CO2 28 04/04/2024    ANIONGAP 11 04/04/2024    BUN 13 04/04/2024    CREATININE 1.03 04/04/2024    GFRMALE CANCELED 07/16/2023    CALCIUM 9.2 04/04/2024    PHOS 3.1  04/04/2024    ALBUMIN 3.9 04/04/2024        Lab Results   Component Value Date    HGBA1C 7.9 (A) 07/14/2023       No results found for this or any previous visit from the past 1095 days.      Assessment/Plan   ASHD (cath 2021with mild-mod CAD (mod severe OM1 lesion - med mgt)  HTN  LVH  Paroxysmal Afib seen on loop recorder in 9/2023 now on OAC with eliquis,  AZAEL on CPAP    Doing generally well from CV perspective.  No angina, no Afib sx, no bleeding on OAC.  Asking for IRL to be removed.    Plan:  Continue current meds  We schedule with Dr. Cotton for IRL removal  Check TSH and CXR 2 view due to chronic longterm amiodarone duse  We also discussed the med interactions with amiodarone as well as risks/benefits - he will let us know if he has any med changes (tizanidine, guanfacine)

## 2024-04-18 ENCOUNTER — APPOINTMENT (OUTPATIENT)
Dept: CARDIOLOGY | Facility: CLINIC | Age: 69
End: 2024-04-18
Payer: COMMERCIAL

## 2024-05-01 ENCOUNTER — TELEPHONE (OUTPATIENT)
Dept: CARDIOLOGY | Facility: CLINIC | Age: 69
End: 2024-05-01
Payer: COMMERCIAL

## 2024-05-01 NOTE — TELEPHONE ENCOUNTER
Called patient with CXR and TSH results.  These were done for surveillance since he is on amiodarone.l

## 2024-06-05 ENCOUNTER — HOSPITAL ENCOUNTER (OUTPATIENT)
Dept: CARDIOLOGY | Facility: HOSPITAL | Age: 69
Discharge: HOME | End: 2024-06-05
Payer: COMMERCIAL

## 2024-06-05 DIAGNOSIS — Z95.818 IMPLANTABLE LOOP RECORDER PRESENT: ICD-10-CM

## 2024-06-05 DIAGNOSIS — I48.91 ATRIAL FIBRILLATION, UNSPECIFIED TYPE (MULTI): ICD-10-CM

## 2024-06-05 PROCEDURE — 93298 REM INTERROG DEV EVAL SCRMS: CPT

## 2024-06-06 ENCOUNTER — APPOINTMENT (OUTPATIENT)
Dept: CARDIOLOGY | Facility: HOSPITAL | Age: 69
End: 2024-06-06
Payer: COMMERCIAL

## 2024-06-26 ENCOUNTER — TELEPHONE (OUTPATIENT)
Dept: CARDIOLOGY | Facility: CLINIC | Age: 69
End: 2024-06-26
Payer: COMMERCIAL

## 2024-06-26 NOTE — TELEPHONE ENCOUNTER
Called patient o reschedule his 08/19/2024 appointment with Joseph.  Had to leave a voicemail. MR Vicky.

## 2024-08-14 PROBLEM — I48.91 ATRIAL FIBRILLATION (MULTI): Status: ACTIVE | Noted: 2023-06-15

## 2024-08-14 NOTE — PROGRESS NOTES
Chief Complaint/Reason for Visit:   Patient is coming in for 4 month cardiovascular follow up.     History Of Present Illness:    Mr. Stokes is coming in today as a 4-month cardiovascular follow-up.  We have followed this patient previously for ASHD, hypertension, atrial fibrillation, and LVH.  Heart catheterization in 2021 showed mild to moderate coronary artery disease with moderate to severe OM1 lesion treated medically.  He also has a past medical history significant for DM, AZAEL (on CPAP), BPH, and CKD.  At the patient's most recent office visit, he was referred back to the EP service for removal of the loop recorder, TSH and chest x-rays were ordered due to being on amiodarone.  TSH from April 17, 2024 was 0.90.  Chest x-ray from the same day showed no evidence of acute intrathoracic abnormality, normal chest x-ray.    Patient comes in today generally feeling well.  He has had no recent hospitalizations or emergency room visits.  He had back surgery in the fall and is still slowly recovering.  He is currently using a wheeled walker.  Patient denies any chest pain, pressure, palpitations, orthopnea, or edema.  As far as he knows he is staying in normal rhythm.  He is taking his medication as prescribed    Past Medical History:  He has a past medical history of Anxiety disorder, unspecified, Depression, unspecified, Personal history of other diseases of the digestive system, Personal history of other diseases of the digestive system, Personal history of other diseases of the musculoskeletal system and connective tissue, Personal history of other diseases of the musculoskeletal system and connective tissue, Personal history of other diseases of urinary system, Personal history of other endocrine, nutritional and metabolic disease (02/16/2021), Personal history of other endocrine, nutritional and metabolic disease, Personal history of other infectious and parasitic diseases, Post-traumatic stress disorder,  "unspecified, and Type 2 diabetes mellitus without complications (Multi).    Past Surgical History:  He has a past surgical history that includes Adenoidectomy (03/19/2018); Tonsillectomy (03/19/2018); Other surgical history (12/01/2020); and CT angio coronary art with heartflow if score >30% (4/2/2021).      Social History:  He reports that he has never smoked. He has never used smokeless tobacco. No history on file for alcohol use and drug use.    Family History:  Family History   Problem Relation Name Age of Onset    Cancer Mother      Hypertension Mother      Other (cerebrovascular) Maternal Grandmother      Hypertension Maternal Grandmother      Other (cerebrovascular) Paternal Grandmother      Hypertension Paternal Grandmother      Cancer Other aunt     Other (cardiac disorder) Other      Diabetes Other      Kidney disease Other      Cancer Other      Prostate cancer Other      Thyroid disease Other      Hypertension Other      Stroke Other          Allergies:  Carbamazepine, Ace inhibitors, and Aripiprazole    Medications:  Current Outpatient Medications   Medication Instructions    amiodarone (PACERONE) 200 mg, oral, Daily RT    artificial tears, dextran-hypomel-glycerin, 0.1-0.3-0.2 % ophthalmic solution ophthalmic (eye), 4 times daily PRN    aspirin 81 mg chewable tablet 1 tablet, oral, Daily    atorvastatin (LIPITOR) 80 mg, oral, Nightly    BD Ultra-Fine Mini Pen Needle 31 gauge x 3/16\" needle USE WITH INSULIN TWICE DAILY    cholecalciferol, vitamin D3, 250 mcg (10,000 unit) capsule oral, Daily    dilTIAZem ER (TIAZAC) 180 mg, oral, Daily    Eliquis 5 mg, oral, 2 times daily    fluticasone (Flonase) 50 mcg/actuation nasal spray nasal    gabapentin (NEURONTIN) 600 mg, oral, 3 times daily    guanFACINE (Intuniv) 4 mg 24 hr tablet     HumaLOG Mix 75-25 KwikPen 100 unit/mL (75-25) injection subcutaneous, 2 times daily (morning and late afternoon)    lamoTRIgine (LaMICtal) 200 mg tablet 1 tablet, oral, Daily    " "lidocaine (Lidoderm) 5 % patch Topical    linaCLOtide (Linzess) 145 mcg capsule oral    metFORMIN (Glucophage) 1,000 mg tablet 1 tablet, oral, 2 times daily (morning and late afternoon)    mirtazapine (Remeron) 30 mg tablet 1 tablet, oral, Nightly    omeprazole (PriLOSEC) 20 mg DR capsule 1 capsule, oral, Daily    OneTouch Ultra Test strip USE TO TEST BLOOD SUGAR TWICE DAILY AS DIRECTED    OneTouch Ultra2 Meter misc USE THREE TIMES DAILY AS DIRECTED    tiZANidine (Zanaflex) 4 mg tablet oral, Every 8 hours PRN    TRUEplus Insulin 1 mL 30 gauge x 5/16 syringe USE ONCE DAILY WITH INSULIN    TRUEplus Lancets 28 gauge USE TWICE DAILY AS DIRECTED    vitamins A,C,E-zinc-copper (PreserVision AREDS) 4,296 mcg-226 mg-90 mg capsule oral       Review of Systems:  Review of Systems   Constitutional: Negative. Negative for decreased appetite and malaise/fatigue.   HENT: Negative.     Eyes:  Negative for blurred vision and visual disturbance.   Cardiovascular:  Negative for chest pain, dyspnea on exertion, irregular heartbeat, leg swelling, orthopnea, palpitations and syncope.   Respiratory: Negative.  Negative for cough and shortness of breath.    Musculoskeletal:  Positive for arthritis and back pain. Negative for falls.        Had back surgery last SEPT and still recovering.   Gastrointestinal: Negative.    Neurological:  Negative for focal weakness and light-headedness.   Psychiatric/Behavioral:  Negative for depression and memory loss.         Vitals  Visit Vitals  /74 (BP Location: Right arm, Patient Position: Sitting, BP Cuff Size: Adult)   Pulse 67   Ht 1.88 m (6' 2\")   Wt 108 kg (237 lb)   SpO2 99%   BMI 30.43 kg/m²   Smoking Status Never   BSA 2.37 m²        Physical Exam:  Physical Exam  Constitutional:       Appearance: Normal appearance.   HENT:      Head: Normocephalic.   Eyes:      Conjunctiva/sclera: Conjunctivae normal.   Cardiovascular:      Rate and Rhythm: Normal rate and regular rhythm.      Pulses: " Normal pulses.      Heart sounds: S1 normal and S2 normal. No murmur heard.     No friction rub. No gallop.   Pulmonary:      Effort: Pulmonary effort is normal.      Breath sounds: Normal breath sounds.   Abdominal:      General: Bowel sounds are normal.      Palpations: Abdomen is soft.      Tenderness: There is no abdominal tenderness.   Musculoskeletal:      Cervical back: Neck supple.      Right lower leg: No edema.      Left lower leg: No edema.   Skin:     General: Skin is warm and dry.   Neurological:      General: No focal deficit present.      Mental Status: He is alert and oriented to person, place, and time.   Psychiatric:         Attention and Perception: Attention normal.         Mood and Affect: Mood normal.           Last Labs:  CBC -  Lab Results   Component Value Date    WBC 6.3 04/04/2024    HGB 12.3 (L) 04/04/2024    HCT 40.3 (L) 04/04/2024    MCV 82 04/04/2024     04/04/2024     Lab Results   Component Value Date    GLUCOSE 136 (H) 04/04/2024    CALCIUM 9.2 04/04/2024     04/04/2024    K 4.6 04/04/2024    CO2 28 04/04/2024     04/04/2024    BUN 13 04/04/2024    CREATININE 1.03 04/04/2024      CMP -  Lab Results   Component Value Date    CALCIUM 9.2 04/04/2024    PHOS 3.1 04/04/2024    PROT 8.2 07/13/2023    ALBUMIN 3.9 04/04/2024    AST 39 07/13/2023    ALT 26 07/13/2023    ALKPHOS 69 07/13/2023    BILITOT 0.9 07/13/2023       LIPID PANEL -   Lab Results   Component Value Date    CHOL 172 09/22/2022    TRIG 305 (H) 09/22/2022    HDL 29.6 (A) 09/22/2022    CHHDL 5.8 (A) 09/22/2022    LDLF 81 09/22/2022    VLDL 61 (H) 09/22/2022    NHDL 142 09/22/2022       Lab Results   Component Value Date    HGBA1C 7.9 (A) 07/14/2023       Last Cardiology Tests:  ECG:  EKG done in the office today and personally reviewed shows sinus rhythm at 65 bpm, QT corrected interval 399 ms.  Inferior infarct, age undetermined.  This will go to Dr. Herrera for final review.    Echo:11-9-20  CONCLUSIONS:    1. The left ventricular systolic function is normal with a 60-65% estimated ejection fraction.   2. Moderately increased left ventricular septal thickness.     Cath:5-27-21  Recommendations:  Maximize medical therapy.  Agressive risk factor modification efforts.  Telemetry monitoring.  Follow-up with cardiology clinic.  Monitor vitals and arterial access site/pulses.  Prompt evaluation for recurrent chest pain.  Lipid lowering agent or Statin therapy.  Medical management of coronary artery disease.  Aspirin therapy.  Beta blocker therapy.     ____________________________________________________________________________________  CONCLUSIONS:   1. Heavily calcified coronary arteries with mild-moderate nonobstructive disease.   2. The OM1 demonstrated a mod-severe calcified mid lesion. RFR was performed but was considered to be unreliable to pressure drift. No PCI was performed.   3. No significant LV-AO peak to peak gradient.   4. Left Ventricular end-diastolic pressure = 26.        Lab review: I have personally reviewed the laboratory result(s)     Assessment/Plan:  ASHD: Patient had a heart catheterization in 2021 showing mild to moderate coronary artery disease with a moderately severe OM1 lesion.  Medical management was recommended.  Patient will continue on low-dose aspirin, and atorvastatin.  Patient is angina class 0.    Atrial fibrillation: Patient has been maintaining sinus rhythm with the use of amiodarone.  He has an elevated [id number] score and should be on anticoagulation.  Patient denies any abnormal bleeding or bruising.  Patient will continue on Eliquis.  EKG done today is acceptable, QT corrected intervals 399 ms.  Last set of amiodarone labs were within normal limits, chest x-ray was normal.  Patient does have a loop recorder that he would like removed, I messaged the EP team to arrange removal.    Hypertension: Blood pressure today shows excellent control.  He will continue on diltiazem 180 mg  daily.  I encouraged her to be on a heart healthy low-sodium diet.    AZAEL: Patient should continue to wear his CPAP as prescribed.    Patient will be due for amiodarone labs in October, see orders.  He will follow-up with Dr. Herrera in 6 months.  Patient instructed to call with any cardiovascular complaints. All questions were answered.       Dragon dictation was utilized to create this document. Quite often unanticipated grammatical, syntax,  and other interpretive errors are inadvertently transcribed by the computer software.  Please disregard these errors.  Please excuse any errors that have escaped final proofreading.            Adalgisa Corrigan, APRN-CNP

## 2024-08-19 ENCOUNTER — APPOINTMENT (OUTPATIENT)
Dept: CARDIOLOGY | Facility: CLINIC | Age: 69
End: 2024-08-19
Payer: COMMERCIAL

## 2024-08-29 ENCOUNTER — APPOINTMENT (OUTPATIENT)
Dept: CARDIOLOGY | Facility: CLINIC | Age: 69
End: 2024-08-29
Payer: COMMERCIAL

## 2024-08-29 VITALS
BODY MASS INDEX: 30.42 KG/M2 | HEIGHT: 74 IN | DIASTOLIC BLOOD PRESSURE: 74 MMHG | OXYGEN SATURATION: 99 % | HEART RATE: 67 BPM | WEIGHT: 237 LBS | SYSTOLIC BLOOD PRESSURE: 124 MMHG

## 2024-08-29 DIAGNOSIS — G47.33 OSA ON CPAP: ICD-10-CM

## 2024-08-29 DIAGNOSIS — R93.1 ELEVATED CORONARY ARTERY CALCIUM SCORE: ICD-10-CM

## 2024-08-29 DIAGNOSIS — I48.0 PAROXYSMAL ATRIAL FIBRILLATION (MULTI): ICD-10-CM

## 2024-08-29 DIAGNOSIS — I10 HYPERTENSION, BENIGN: Primary | ICD-10-CM

## 2024-08-29 DIAGNOSIS — I25.10 CORONARY ARTERY DISEASE INVOLVING NATIVE CORONARY ARTERY OF NATIVE HEART WITHOUT ANGINA PECTORIS: ICD-10-CM

## 2024-08-29 PROCEDURE — 3074F SYST BP LT 130 MM HG: CPT | Performed by: NURSE PRACTITIONER

## 2024-08-29 PROCEDURE — 1160F RVW MEDS BY RX/DR IN RCRD: CPT | Performed by: NURSE PRACTITIONER

## 2024-08-29 PROCEDURE — 1036F TOBACCO NON-USER: CPT | Performed by: NURSE PRACTITIONER

## 2024-08-29 PROCEDURE — 3078F DIAST BP <80 MM HG: CPT | Performed by: NURSE PRACTITIONER

## 2024-08-29 PROCEDURE — 99214 OFFICE O/P EST MOD 30 MIN: CPT | Performed by: NURSE PRACTITIONER

## 2024-08-29 PROCEDURE — 1159F MED LIST DOCD IN RCRD: CPT | Performed by: NURSE PRACTITIONER

## 2024-08-29 PROCEDURE — 93005 ELECTROCARDIOGRAM TRACING: CPT | Performed by: NURSE PRACTITIONER

## 2024-08-29 PROCEDURE — 93010 ELECTROCARDIOGRAM REPORT: CPT | Performed by: INTERNAL MEDICINE

## 2024-08-29 PROCEDURE — 3060F POS MICROALBUMINURIA REV: CPT | Performed by: NURSE PRACTITIONER

## 2024-08-29 PROCEDURE — 3008F BODY MASS INDEX DOCD: CPT | Performed by: NURSE PRACTITIONER

## 2024-08-29 ASSESSMENT — ENCOUNTER SYMPTOMS
LIGHT-HEADEDNESS: 0
MEMORY LOSS: 0
DECREASED APPETITE: 0
BACK PAIN: 1
SYNCOPE: 0
DYSPNEA ON EXERTION: 0
COUGH: 0
GASTROINTESTINAL NEGATIVE: 1
RESPIRATORY NEGATIVE: 1
IRREGULAR HEARTBEAT: 0
ORTHOPNEA: 0
CONSTITUTIONAL NEGATIVE: 1
BLURRED VISION: 0
PALPITATIONS: 0
FALLS: 0
SHORTNESS OF BREATH: 0
FOCAL WEAKNESS: 0
DEPRESSION: 0

## 2024-08-29 NOTE — PATIENT INSTRUCTIONS
Continue on current meds  Heart healthy, low sodium diet  Mediterranean diet is recommended  Amio labs due this fall  I put in request for loop removal  Follow up with Dr Herrera in 6 months

## 2024-09-04 ENCOUNTER — APPOINTMENT (OUTPATIENT)
Dept: CARDIOLOGY | Facility: HOSPITAL | Age: 69
End: 2024-09-04
Payer: COMMERCIAL

## 2024-09-04 DIAGNOSIS — I48.0 PAROXYSMAL ATRIAL FIBRILLATION (MULTI): Primary | ICD-10-CM

## 2024-09-05 ENCOUNTER — APPOINTMENT (OUTPATIENT)
Dept: CARDIOLOGY | Facility: HOSPITAL | Age: 69
End: 2024-09-05
Payer: COMMERCIAL

## 2024-09-06 ENCOUNTER — APPOINTMENT (OUTPATIENT)
Dept: CARDIOLOGY | Facility: HOSPITAL | Age: 69
End: 2024-09-06
Payer: COMMERCIAL

## 2024-09-09 ENCOUNTER — TELEPHONE (OUTPATIENT)
Dept: CARDIOLOGY | Facility: HOSPITAL | Age: 69
End: 2024-09-09
Payer: COMMERCIAL

## 2024-09-09 NOTE — TELEPHONE ENCOUNTER
9/6 - Called and spoke with patient to schedule loop recorder implant with Dr. Cotton. Patient agreeable to Monday 9/30 12pm - arrive at 11am at [facility]. Patient informed of the following instructions:    May eat a light breakfast prior to appt  May drive to/from appt  Report to main cardiology @ [place] 11am      Patient verbalized understanding of all instructions and agreeable to plan.   Appt reminder mailed out 9/9.

## 2024-09-28 ENCOUNTER — TELEPHONE (OUTPATIENT)
Dept: CARDIOLOGY | Facility: HOSPITAL | Age: 69
End: 2024-09-28
Payer: COMMERCIAL

## 2024-09-30 ENCOUNTER — HOSPITAL ENCOUNTER (OUTPATIENT)
Dept: CARDIOLOGY | Facility: HOSPITAL | Age: 69
Discharge: HOME | End: 2024-09-30
Payer: COMMERCIAL

## 2024-09-30 ENCOUNTER — APPOINTMENT (OUTPATIENT)
Dept: CARDIOLOGY | Facility: HOSPITAL | Age: 69
End: 2024-09-30
Payer: COMMERCIAL

## 2024-09-30 VITALS
WEIGHT: 238 LBS | SYSTOLIC BLOOD PRESSURE: 127 MMHG | TEMPERATURE: 97.7 F | HEIGHT: 74 IN | OXYGEN SATURATION: 100 % | BODY MASS INDEX: 30.54 KG/M2 | HEART RATE: 65 BPM | DIASTOLIC BLOOD PRESSURE: 75 MMHG | RESPIRATION RATE: 12 BRPM

## 2024-09-30 DIAGNOSIS — I48.0 PAROXYSMAL ATRIAL FIBRILLATION (MULTI): ICD-10-CM

## 2024-09-30 PROCEDURE — 33286 RMVL SUBQ CAR RHYTHM MNTR: CPT | Performed by: INTERNAL MEDICINE

## 2024-09-30 PROCEDURE — 33286 RMVL SUBQ CAR RHYTHM MNTR: CPT

## 2024-09-30 ASSESSMENT — PAIN SCALES - GENERAL: PAINLEVEL_OUTOF10: 0 - NO PAIN

## 2024-09-30 ASSESSMENT — PAIN - FUNCTIONAL ASSESSMENT: PAIN_FUNCTIONAL_ASSESSMENT: 0-10

## 2024-09-30 NOTE — PRE-SEDATION DOCUMENTATION
Sedation Plan          Risks, benefits, and alternatives discussed with patient.    -local anesthetic

## 2024-09-30 NOTE — H&P
History Of Present Illness  Quinn Stokes is a 69 y.o. male presenting with history afib and loop implant for afib burden monitoring. She had 2.7% Afib burden. She has a past medical history of ASHD, hypertension, atrial fibrillation, and LVH. Heart catheterization in 2021 showed mild to moderate coronary artery disease with moderate to severe OM1 lesion treated medically. He also has a past medical history significant for DM, AZAEL (on CPAP), BPH, and CKD. We will remove implant today.      Past Medical History  Past Medical History:   Diagnosis Date    Anxiety disorder, unspecified     Anxiety    Depression, unspecified     Depression, controlled    Personal history of other diseases of the digestive system     History of gastroesophageal reflux (GERD)    Personal history of other diseases of the digestive system     History of intestinal obstruction    Personal history of other diseases of the musculoskeletal system and connective tissue     History of scoliosis    Personal history of other diseases of the musculoskeletal system and connective tissue     History of herniated intervertebral disc    Personal history of other diseases of urinary system     History of kidney disease    Personal history of other endocrine, nutritional and metabolic disease 02/16/2021    History of high cholesterol    Personal history of other endocrine, nutritional and metabolic disease     History of diabetic neuropathy    Personal history of other infectious and parasitic diseases     History of hepatitis C virus infection    Post-traumatic stress disorder, unspecified     PTSD (post-traumatic stress disorder)    Type 2 diabetes mellitus without complications (Multi)     Diabetes type 2, controlled       Surgical History  Past Surgical History:   Procedure Laterality Date    ADENOIDECTOMY  03/19/2018    Adenoidectomy    CT ANGIO CORONARY ART WITH HEARTFLOW IF SCORE >30%  4/2/2021    CT HEART CORONARY ANGIOGRAM 4/2/2021 [facility]  "ANCILLARY LEGACY    OTHER SURGICAL HISTORY  12/01/2020    Colonoscopy    TONSILLECTOMY  03/19/2018    Tonsillectomy        Social History  He reports that he has never smoked. He has never used smokeless tobacco. No history on file for alcohol use and drug use.    Family History  Family History   Problem Relation Name Age of Onset    Cancer Mother      Hypertension Mother      Other (cerebrovascular) Maternal Grandmother      Hypertension Maternal Grandmother      Other (cerebrovascular) Paternal Grandmother      Hypertension Paternal Grandmother      Cancer Other aunt     Other (cardiac disorder) Other      Diabetes Other      Kidney disease Other      Cancer Other      Prostate cancer Other      Thyroid disease Other      Hypertension Other      Stroke Other          Allergies  Carbamazepine, Ace inhibitors, and Aripiprazole    Review of Systems     Physical Exam     Last Recorded Vitals  Blood pressure 127/75, pulse 65, temperature 36.5 °C (97.7 °F), temperature source Temporal, resp. rate 12, height 1.88 m (6' 2.02\"), weight 108 kg (238 lb), SpO2 100%.    Relevant Results  No results found.       Assessment/Plan   Assessment & Plan  Paroxysmal atrial fibrillation (Multi)      Plan: remove implant        I spent 15 minutes in the professional and overall care of this patient.      Shanell Diaz, APRN-CNP    "

## 2024-09-30 NOTE — DISCHARGE INSTRUCTIONS
You are all finished with your loop recorder removal     Please leave your dressing on for three days. Do not get it wet.     After three days from your procedure you may remove your dressing, gently clean the area with soap and water, rinse and dry. Leave uncovered.     Watch out for signs and symptoms of infection and Notify Dr. Cotton's office at [phone] or the Cath Lab NP Lorenza Diaz at [phone] which could include any of the following:   Redness, drainage, fever, chills or increased pain at the site.     If you have any pain you can use over the counter medications and/or ice as needed     Please call if you have any questions, ROSA Diaz [phone]

## 2024-10-01 LAB — BODY SURFACE AREA: 2.37 M2

## 2024-10-17 ENCOUNTER — LAB (OUTPATIENT)
Dept: LAB | Facility: LAB | Age: 69
End: 2024-10-17
Payer: COMMERCIAL

## 2024-10-17 DIAGNOSIS — N18.32 CHRONIC KIDNEY DISEASE, STAGE 3B (MULTI): Primary | ICD-10-CM

## 2024-10-17 LAB
25(OH)D3 SERPL-MCNC: 34 NG/ML (ref 30–100)
ALBUMIN SERPL BCP-MCNC: 4 G/DL (ref 3.4–5)
ANION GAP SERPL CALC-SCNC: 12 MMOL/L (ref 10–20)
BUN SERPL-MCNC: 13 MG/DL (ref 6–23)
CALCIUM SERPL-MCNC: 9.9 MG/DL (ref 8.6–10.3)
CHLORIDE SERPL-SCNC: 104 MMOL/L (ref 98–107)
CO2 SERPL-SCNC: 29 MMOL/L (ref 21–32)
CREAT SERPL-MCNC: 1.27 MG/DL (ref 0.5–1.3)
CREAT UR-MCNC: 196 MG/DL (ref 20–370)
EGFRCR SERPLBLD CKD-EPI 2021: 62 ML/MIN/1.73M*2
ERYTHROCYTE [DISTWIDTH] IN BLOOD BY AUTOMATED COUNT: 14.8 % (ref 11.5–14.5)
GLUCOSE SERPL-MCNC: 78 MG/DL (ref 74–99)
HCT VFR BLD AUTO: 42.6 % (ref 41–52)
HGB BLD-MCNC: 13.4 G/DL (ref 13.5–17.5)
MCH RBC QN AUTO: 26.6 PG (ref 26–34)
MCHC RBC AUTO-ENTMCNC: 31.5 G/DL (ref 32–36)
MCV RBC AUTO: 85 FL (ref 80–100)
NRBC BLD-RTO: 0 /100 WBCS (ref 0–0)
PHOSPHATE SERPL-MCNC: 4.1 MG/DL (ref 2.5–4.9)
PLATELET # BLD AUTO: 252 X10*3/UL (ref 150–450)
POTASSIUM SERPL-SCNC: 4.9 MMOL/L (ref 3.5–5.3)
PROT UR-ACNC: 24 MG/DL (ref 5–25)
PROT/CREAT UR: 0.12 MG/MG CREAT (ref 0–0.17)
PTH-INTACT SERPL-MCNC: 31.2 PG/ML (ref 18.5–88)
RBC # BLD AUTO: 5.03 X10*6/UL (ref 4.5–5.9)
SODIUM SERPL-SCNC: 140 MMOL/L (ref 136–145)
WBC # BLD AUTO: 5.9 X10*3/UL (ref 4.4–11.3)

## 2024-10-17 PROCEDURE — 85027 COMPLETE CBC AUTOMATED: CPT

## 2024-10-17 PROCEDURE — 80069 RENAL FUNCTION PANEL: CPT

## 2024-10-17 PROCEDURE — 84156 ASSAY OF PROTEIN URINE: CPT

## 2024-10-17 PROCEDURE — 82306 VITAMIN D 25 HYDROXY: CPT

## 2024-10-17 PROCEDURE — 82570 ASSAY OF URINE CREATININE: CPT

## 2024-10-17 PROCEDURE — 83970 ASSAY OF PARATHORMONE: CPT

## 2025-02-27 LAB
ALT SERPL-CCNC: 18 U/L (ref 9–46)
AST SERPL-CCNC: 21 U/L (ref 10–35)
ERYTHROCYTE [DISTWIDTH] IN BLOOD BY AUTOMATED COUNT: 14.4 % (ref 11–15)
HCT VFR BLD AUTO: 41.7 % (ref 38.5–50)
HGB BLD-MCNC: 13.3 G/DL (ref 13.2–17.1)
MCH RBC QN AUTO: 26.8 PG (ref 27–33)
MCHC RBC AUTO-ENTMCNC: 31.9 G/DL (ref 32–36)
MCV RBC AUTO: 84.1 FL (ref 80–100)
PLATELET # BLD AUTO: 203 THOUSAND/UL (ref 140–400)
PMV BLD REES-ECKER: 12.6 FL (ref 7.5–12.5)
RBC # BLD AUTO: 4.96 MILLION/UL (ref 4.2–5.8)
TSH SERPL-ACNC: 2.96 MIU/L (ref 0.4–4.5)
WBC # BLD AUTO: 6 THOUSAND/UL (ref 3.8–10.8)

## 2025-02-27 NOTE — PROGRESS NOTES
Chief Complaint:   No chief complaint on file.     History Of Present Illness:    Quinn Stokes is a 69 y.o. male presenting for 6-month follow-up.  H/o  ASHD (cath 2021 with mild-mod CAD, mod severe OM1 lesion - med mgt), HTN, LVH, Afib seen on loop recorder in 9/2023 now on OAC with Eliquis, HLD, DM2, AZAEL on CPAP, CKD, BPH with urinary frequency/incontinence who presents for follow up.      At his last visit with me in 4/2024, Pt was referred back to the EP service for removal of the loop recorder. Last seen in 8/2024 by PRISCILLA Corrigan. At the time, he was doing well, recovering from back surgery done last fall. He has an nerve conduction study coming up. He is being assessed for his drop foot.    Denies chest pressure. He checks his BP daily which is usually controlled around 110/70s. Denies dizziness at current BP. Tolerating Eliquis without bleeding. He is fasted today and can get his blood work done. Declines refills today. He is not using his CPAP anymore - he does not tolerate it and ends up being more tired. He has an appointment scheduled to get an Inspire device.    He is still grieving his brother's passing but reports he has great support at home from his family and friend. He is taking things day by day.     Review Of Systems:  The remainder of the review of systems was obtained, and was negative as pertains to the chief complaint.    CV testing and labs reviewed:    Labs 2/2025:  ALT 18  AST 21  H&H 13.3 41.7  TSH 2.96  10/2024:  K 4.9  BUN 13  Cr 1.27  GFR 62  Lipid panel 9/2022:    HDL 29.6  LDL 81      LHC   5/2021    CONCLUSIONS:   1. Heavily calcified coronary arteries with mild-moderate nonobstructive disease.   2. The OM1 demonstrated a mod-severe calcified mid lesion. RFR was performed but was considered to be unreliable to pressure drift. No PCI was performed.   3. No significant LV-AO peak to peak gradient.   4. Left Ventricular end-diastolic pressure = 26.     Stress MPI  "3/2021  IMPRESSION:  1. Normal stress myocardial perfusion imaging in response to  pharmacologic stress. Mild left ventricular systolic dysfunction on  post stress gated imaging.  TTE 11/2020   EF 60-65% ;eft atrium mildly dilated, aortic root is abnormal mild dilatation of the ascending aorta     Prior Hx:  ~ 15 yrs ago he was started on medication to help \"preserve his kidneys.\" He was started on vasotec > lisinopril but had allergies to these. He was then put on diltiazem once a day which he took for many years. He saw a nephrologist and was switched to extended release diltiazem.  Had an episode of Afib in 2009 - had a \"sensation in my chest,\" he was diagnosed with atrial fibrillation at that time - he was started on a medication which he does not recall but which tasted like metal. Subsequent EKG's since then demonstrated normal sinus rhythm. He saw a cardiologist a year afterward and was taken off of that medication. Since then no episodes of Afib, no EKG's with Afib.     Last Recorded Vitals:  There were no vitals filed for this visit.    Past Medical History:  He has a past medical history of Anxiety disorder, unspecified, Depression, unspecified, Personal history of other diseases of the digestive system, Personal history of other diseases of the digestive system, Personal history of other diseases of the musculoskeletal system and connective tissue, Personal history of other diseases of the musculoskeletal system and connective tissue, Personal history of other diseases of urinary system, Personal history of other endocrine, nutritional and metabolic disease (02/16/2021), Personal history of other endocrine, nutritional and metabolic disease, Personal history of other infectious and parasitic diseases, Post-traumatic stress disorder, unspecified, and Type 2 diabetes mellitus without complications (Multi).    Past Surgical History:  He has a past surgical history that includes Adenoidectomy (03/19/2018); " "Tonsillectomy (03/19/2018); Other surgical history (12/01/2020); and CT angio coronary art with heartflow if score >30% (4/2/2021).      Social History:  He reports that he has never smoked. He has never used smokeless tobacco. No history on file for alcohol use and drug use.    Family History:  Family History   Problem Relation Name Age of Onset    Cancer Mother      Hypertension Mother      Other (cerebrovascular) Maternal Grandmother      Hypertension Maternal Grandmother      Other (cerebrovascular) Paternal Grandmother      Hypertension Paternal Grandmother      Cancer Other aunt     Other (cardiac disorder) Other      Diabetes Other      Kidney disease Other      Cancer Other      Prostate cancer Other      Thyroid disease Other      Hypertension Other      Stroke Other          Allergies:  Carbamazepine, Ace inhibitors, and Aripiprazole    Outpatient Medications:  Current Outpatient Medications   Medication Instructions    amiodarone (PACERONE) 200 mg, oral, Daily RT    artificial tears, dextran-hypomel-glycerin, 0.1-0.3-0.2 % ophthalmic solution ophthalmic (eye), 4 times daily PRN    aspirin 81 mg chewable tablet 1 tablet, oral, Daily    atorvastatin (LIPITOR) 80 mg, oral, Nightly    BD Ultra-Fine Mini Pen Needle 31 gauge x 3/16\" needle USE WITH INSULIN TWICE DAILY    cholecalciferol, vitamin D3, 250 mcg (10,000 unit) capsule oral, Daily    dilTIAZem ER (TIAZAC) 180 mg, oral, Daily    Eliquis 5 mg, oral, 2 times daily    fluticasone (Flonase) 50 mcg/actuation nasal spray nasal    gabapentin (NEURONTIN) 600 mg, oral, 3 times daily    guanFACINE (Intuniv) 4 mg 24 hr tablet     HumaLOG Mix 75-25 KwikPen 100 unit/mL (75-25) injection subcutaneous, 2 times daily (morning and late afternoon)    lamoTRIgine (LaMICtal) 200 mg tablet 1 tablet, oral, Daily    lidocaine (Lidoderm) 5 % patch Topical    linaCLOtide (Linzess) 145 mcg capsule oral    metFORMIN (Glucophage) 1,000 mg tablet 1 tablet, oral, 2 times daily " (morning and late afternoon)    mirtazapine (Remeron) 30 mg tablet 1 tablet, oral, Nightly    omeprazole (PriLOSEC) 20 mg DR capsule 1 capsule, oral, Daily    OneTouch Ultra Test strip USE TO TEST BLOOD SUGAR TWICE DAILY AS DIRECTED    OneTouch Ultra2 Meter misc USE THREE TIMES DAILY AS DIRECTED    tiZANidine (Zanaflex) 4 mg tablet oral, Every 8 hours PRN    TRUEplus Insulin 1 mL 30 gauge x 5/16 syringe USE ONCE DAILY WITH INSULIN    TRUEplus Lancets 28 gauge USE TWICE DAILY AS DIRECTED    vitamins A,C,E-zinc-copper (PreserVision AREDS) 4,296 mcg-226 mg-90 mg capsule oral       Physical Exam:  Physical Exam  Vitals reviewed.   HENT:      Head: Normocephalic.      Nose: Nose normal.      Mouth/Throat:      Mouth: Mucous membranes are moist.   Cardiovascular:      Rate and Rhythm: Normal rate and regular rhythm.      Heart sounds: No murmur heard.  Pulmonary:      Effort: Pulmonary effort is normal.      Breath sounds: Normal breath sounds.      Comments: Diminished breath sounds  No crackles, or wheezing  Abdominal:      General: Abdomen is flat.      Palpations: Abdomen is soft.   Musculoskeletal:         General: Normal range of motion.      Cervical back: Normal range of motion.   Skin:     General: Skin is warm and dry.   Neurological:      General: No focal deficit present.      Mental Status: He is alert.   Psychiatric:         Mood and Affect: Mood normal.        Last Labs:  CBC -  Lab Results   Component Value Date    WBC 6.0 02/26/2025    HGB 13.3 02/26/2025    HCT 41.7 02/26/2025    MCV 84.1 02/26/2025     02/26/2025       CMP -  Lab Results   Component Value Date    CALCIUM 9.9 10/17/2024    PHOS 4.1 10/17/2024    PROT 8.2 07/13/2023    ALBUMIN 4.0 10/17/2024    AST 21 02/26/2025    ALT 18 02/26/2025    ALKPHOS 69 07/13/2023    BILITOT 0.9 07/13/2023       LIPID PANEL -   Lab Results   Component Value Date    CHOL 172 09/22/2022    TRIG 305 (H) 09/22/2022    HDL 29.6 (A) 09/22/2022    CHHDL 5.8 (A)  09/22/2022    LDLF 81 09/22/2022    VLDL 61 (H) 09/22/2022    NHDL 142 09/22/2022       RENAL FUNCTION PANEL -   Lab Results   Component Value Date    GLUCOSE 78 10/17/2024     10/17/2024    K 4.9 10/17/2024     10/17/2024    CO2 29 10/17/2024    ANIONGAP 12 10/17/2024    BUN 13 10/17/2024    CREATININE 1.27 10/17/2024    GFRMALE CANCELED 07/16/2023    CALCIUM 9.9 10/17/2024    PHOS 4.1 10/17/2024    ALBUMIN 4.0 10/17/2024        Lab Results   Component Value Date    HGBA1C 7.9 (A) 07/14/2023     Assessment/Plan   ASHD:   Per heart catheterization in 2021 showing mild to moderate coronary artery disease with a moderately severe OM1 lesion.    Patient is angina class 0.  -continue medical management as recommended  -continue on low-dose aspirin, and atorvastatin.       Atrial fibrillation:   Patient has been maintaining sinus rhythm with the use of amiodarone.  He has an elevated [id number]-NHLo score and should be on anticoagulation.  Patient denies any abnormal bleeding or bruising.    EKG done today is acceptable, QT corrected intervals 399 ms. Last set of amiodarone labs were within normal limits, chest x-ray was normal.  S/p loop recorder removal 9/2024.   TSH wnl in 2/2025  -continue on Eliquis.       Hypertension:   Today's /78.   Pt checks BP at home daily, which is controlled around 110s/70s.  -continue on diltiazem 180 mg daily  -encouraged her to be on a heart healthy low-sodium diet.     HLD:  FLP 9/2022, LDL 81   - triglycerides elevated.  -atorvastatin 80 mg daily  -ordered rpt FLP for Pt to get drawn after this visit    AZAEL:   Did not tolerate using CPAP.   -has upcoming appt to get Inspire device    Scribe Attestation  By signing my name below, I, Lamar Owen, Scribe   attest that this documentation has been prepared under the direction and in the presence of Rigoberto Herrera MD.

## 2025-02-28 ENCOUNTER — OFFICE VISIT (OUTPATIENT)
Dept: CARDIOLOGY | Facility: HOSPITAL | Age: 70
End: 2025-02-28
Payer: COMMERCIAL

## 2025-02-28 VITALS
HEART RATE: 64 BPM | BODY MASS INDEX: 31.06 KG/M2 | WEIGHT: 242 LBS | SYSTOLIC BLOOD PRESSURE: 136 MMHG | DIASTOLIC BLOOD PRESSURE: 78 MMHG | HEIGHT: 74 IN

## 2025-02-28 DIAGNOSIS — I48.0 PAROXYSMAL ATRIAL FIBRILLATION (MULTI): ICD-10-CM

## 2025-02-28 DIAGNOSIS — I25.10 CORONARY ARTERY DISEASE INVOLVING NATIVE CORONARY ARTERY OF NATIVE HEART WITHOUT ANGINA PECTORIS: ICD-10-CM

## 2025-02-28 DIAGNOSIS — I10 HYPERTENSION, BENIGN: ICD-10-CM

## 2025-02-28 DIAGNOSIS — E78.5 DYSLIPIDEMIA: Primary | ICD-10-CM

## 2025-02-28 LAB
ATRIAL RATE: 64 BPM
P AXIS: 19 DEGREES
P OFFSET: 188 MS
P ONSET: 128 MS
PR INTERVAL: 198 MS
Q ONSET: 227 MS
QRS COUNT: 11 BEATS
QRS DURATION: 86 MS
QT INTERVAL: 372 MS
QTC CALCULATION(BAZETT): 383 MS
QTC FREDERICIA: 380 MS
R AXIS: -23 DEGREES
T AXIS: 13 DEGREES
T OFFSET: 413 MS
VENTRICULAR RATE: 64 BPM

## 2025-02-28 PROCEDURE — 3075F SYST BP GE 130 - 139MM HG: CPT | Performed by: INTERNAL MEDICINE

## 2025-02-28 PROCEDURE — 99215 OFFICE O/P EST HI 40 MIN: CPT | Mod: 25 | Performed by: INTERNAL MEDICINE

## 2025-02-28 PROCEDURE — 93005 ELECTROCARDIOGRAM TRACING: CPT | Performed by: INTERNAL MEDICINE

## 2025-02-28 PROCEDURE — 99215 OFFICE O/P EST HI 40 MIN: CPT | Performed by: INTERNAL MEDICINE

## 2025-02-28 PROCEDURE — 1036F TOBACCO NON-USER: CPT | Performed by: INTERNAL MEDICINE

## 2025-02-28 PROCEDURE — 1159F MED LIST DOCD IN RCRD: CPT | Performed by: INTERNAL MEDICINE

## 2025-02-28 PROCEDURE — 3078F DIAST BP <80 MM HG: CPT | Performed by: INTERNAL MEDICINE

## 2025-02-28 PROCEDURE — 3008F BODY MASS INDEX DOCD: CPT | Performed by: INTERNAL MEDICINE

## 2025-02-28 RX ORDER — INSULIN PMP CART,AUT,G6/7,CNTR
EACH SUBCUTANEOUS
COMMUNITY
Start: 2024-12-20

## 2025-02-28 RX ORDER — INSULIN LISPRO 100 [IU]/ML
INJECTION, SOLUTION INTRAVENOUS; SUBCUTANEOUS
COMMUNITY
Start: 2025-01-21

## 2025-02-28 RX ORDER — BLOOD-GLUCOSE,RECEIVER,CONT
EACH MISCELLANEOUS
COMMUNITY
Start: 2024-10-10

## 2025-02-28 RX ORDER — BLOOD-GLUCOSE SENSOR
EACH MISCELLANEOUS
COMMUNITY
Start: 2024-12-06

## 2025-02-28 NOTE — PATIENT INSTRUCTIONS
Thanks for following up in office today.    1)  I ordered your blood work to check your cholesterol levels after today's visit since you are fasted.     2)  Continue your current cardiac medications    3)  If you fall and/or have significant head trauma, please go to the ER or call 911 since you are on a blood thinner.    4)  Please call 911 if you experience severe chest pain out of the blue If the pain is mild, you can call our office for further testing.      Follow up with Joseph Corrigan in 6 months  If you have any questions, please call us at [phone]

## 2025-03-06 ENCOUNTER — OFFICE VISIT (OUTPATIENT)
Dept: SLEEP MEDICINE | Facility: CLINIC | Age: 70
End: 2025-03-06
Payer: COMMERCIAL

## 2025-03-06 VITALS
WEIGHT: 244 LBS | HEIGHT: 74 IN | HEART RATE: 62 BPM | BODY MASS INDEX: 31.32 KG/M2 | OXYGEN SATURATION: 97 % | DIASTOLIC BLOOD PRESSURE: 75 MMHG | SYSTOLIC BLOOD PRESSURE: 116 MMHG | TEMPERATURE: 96.3 F | RESPIRATION RATE: 16 BRPM

## 2025-03-06 DIAGNOSIS — E66.9 OBESITY (BMI 30-39.9): ICD-10-CM

## 2025-03-06 DIAGNOSIS — Z78.9 INTOLERANCE OF CONTINUOUS POSITIVE AIRWAY PRESSURE (CPAP) VENTILATION: ICD-10-CM

## 2025-03-06 DIAGNOSIS — G47.33 OSA (OBSTRUCTIVE SLEEP APNEA): Primary | ICD-10-CM

## 2025-03-06 DIAGNOSIS — J30.2 SEASONAL ALLERGIES: ICD-10-CM

## 2025-03-06 DIAGNOSIS — I10 BENIGN ESSENTIAL HYPERTENSION: ICD-10-CM

## 2025-03-06 PROCEDURE — 3008F BODY MASS INDEX DOCD: CPT | Performed by: INTERNAL MEDICINE

## 2025-03-06 PROCEDURE — 99204 OFFICE O/P NEW MOD 45 MIN: CPT | Performed by: INTERNAL MEDICINE

## 2025-03-06 PROCEDURE — 1160F RVW MEDS BY RX/DR IN RCRD: CPT | Performed by: INTERNAL MEDICINE

## 2025-03-06 PROCEDURE — 1159F MED LIST DOCD IN RCRD: CPT | Performed by: INTERNAL MEDICINE

## 2025-03-06 PROCEDURE — 99214 OFFICE O/P EST MOD 30 MIN: CPT | Performed by: INTERNAL MEDICINE

## 2025-03-06 PROCEDURE — 3078F DIAST BP <80 MM HG: CPT | Performed by: INTERNAL MEDICINE

## 2025-03-06 PROCEDURE — 3074F SYST BP LT 130 MM HG: CPT | Performed by: INTERNAL MEDICINE

## 2025-03-06 ASSESSMENT — SLEEP AND FATIGUE QUESTIONNAIRES
SITING INACTIVE IN A PUBLIC PLACE LIKE A CLASS ROOM OR A MOVIE THEATER: SLIGHT CHANCE OF DOZING
HOW LIKELY ARE YOU TO NOD OFF OR FALL ASLEEP WHILE WATCHING TV: HIGH CHANCE OF DOZING
ESS-CHAD TOTAL SCORE: 9
WAKING_TOO_EARLY: VERY SEVERE
WORRIED_DISTRESSED_DUE_TO_SLEEP: VERY MUCH NOTICEABLE
DIFFICULTY_STAYING_ASLEEP: VERY SEVERE
HOW LIKELY ARE YOU TO NOD OFF OR FALL ASLEEP IN A CAR, WHILE STOPPED FOR A FEW MINUTES IN TRAFFIC: WOULD NEVER DOZE
HOW LIKELY ARE YOU TO NOD OFF OR FALL ASLEEP WHEN YOU ARE A PASSENGER IN A CAR FOR AN HOUR WITHOUT A BREAK: SLIGHT CHANCE OF DOZING
SLEEP_PROBLEM_INTERFERES_DAILY_ACTIVITIES: VERY MUCH NOTICEABLE
HOW LIKELY ARE YOU TO NOD OFF OR FALL ASLEEP WHILE SITTING AND TALKING TO SOMEONE: SLIGHT CHANCE OF DOZING
SLEEP_PROBLEM_NOTICEABLE_TO_OTHERS: MUCH
DIFFICULTY_FALLING_ASLEEP: VERY SEVERE
HOW LIKELY ARE YOU TO NOD OFF OR FALL ASLEEP WHILE SITTING QUIETLY AFTER LUNCH WITHOUT ALCOHOL: SLIGHT CHANCE OF DOZING
HOW LIKELY ARE YOU TO NOD OFF OR FALL ASLEEP WHILE LYING DOWN TO REST IN THE AFTERNOON WHEN CIRCUMSTANCES PERMIT: MODERATE CHANCE OF DOZING
HOW LIKELY ARE YOU TO NOD OFF OR FALL ASLEEP WHILE SITTING AND READING: WOULD NEVER DOZE
SATISFACTION_WITH_CURRENT_SLEEP_PATTERN: VERY DISSATISFIED

## 2025-03-06 NOTE — PROGRESS NOTES
[facility] SLEEP MEDICINE   NEW CONSULT VISIT NOTE    PCP: CACHORRO Bloom  Referred by: No ref. provider found    HISTORY OF PRESENT ILLNESS     Patient ID: Quinn Stokes is a 69 y.o. male who presents to [facility] for a comprehensive sleep medicine evaluation with concerns of sleep apnea with CPAP intolerance.    Patient is here alone today.  To review, patient's medical history is notable for ***      {OSAhx:43788}    SLEEP STUDY HISTORY (personally reviewed raw data such as interpretation report, data sheet, hypnogram, and titration table if available and applicable)  ***    SLEEP-WAKE SCHEDULE  Bedtime:  2:30 AM to 3 AM daily  Subjective sleep latency: 2-3 hours  Difficulty falling asleep: yes due to mind-racing / rumination / worry  Number of awakenings:  3-4 times per night spontaneously for unknown reasons  Nocturia: Yes (3 times to go to bathroom)  Falls back asleep in 45-60 minutes  Final wake time:  7 AM to 7:30 AM daily  Out of bed time:  9 AM daily  Shift work: No  Naps: 1-2 times per week for 15-20 minutes  Feels rested after a nap: No  Average sleep duration (excluding naps): 4-5 hours     SLEEP ENVIRONMENT  Sleep location: bed  Sleep status: sleeps alone  Preferred sleep position: side  TV in bedroom: Yes  Room is dark: Yes  Room is quiet: Yes  Room is cool: Yes    SLEEP HABITS  Smoking: no  ETOH: no  Marijuana: no  Caffeine: no  Sleep aids: no    SLEEP ROS  Night symptoms: POSITIVE for snoring, waking up with racing heart, and nocturia and NEGATIVE for witnessed apnea, wake up gasping and/or choking for air, nasal congestion , mouth breathing, night sweats during sleep, heartburn or sour taste in mouth at night, and nocturnal cough  Morning symptoms: POSITIVE for unrefreshing sleep and morning dry mouth and NEGATIVE for morning headache and morning sore throat  Daytime symptoms POSITIVE for excessive daytime sleepiness, fatigue, and trouble  "staying focused in daytime and NEGATIVE for trouble remembering things in daytime and irritability in daytime. Patient does not drive  Hypersomnia / narcolepsy symptoms: Patient denies symptoms of a hypersomnolence disorder such as sleep paralysis, sleep-related hallucinations, and cataplexy.   Parasomnia symptoms: POSITIVE for sleep talking and NEGATIVE for sleep walking, acting out dreams, and nightmares  Movements in sleep: POSITIVE for frequent leg kicks / jerks at night while asleep and waking up with bed sheets in disarray and NEGATIVE for seizures during sleep and sleep-related bruxism    RLS screen: Patient denies RLS symptoms.    WEIGHT: gained 10 lbs in 2 months     Claustrophobia: No    REVIEW OF SYSTEMS     All other systems have been reviewed and are negative.    ALLERGIES     Allergies   Allergen Reactions   • Carbamazepine Angioedema, Shortness of breath and Swelling   • Ace Inhibitors Angioedema, Other, Swelling and Unknown   • Aripiprazole Angioedema and Swelling       MEDICATIONS     Current Outpatient Medications   Medication Sig Dispense Refill   • amiodarone (Pacerone) 200 mg tablet Take 1 tablet (200 mg) by mouth once daily. 90 tablet 3   • artificial tears, dextran-hypomel-glycerin, 0.1-0.3-0.2 % ophthalmic solution Administer into affected eye(s) 4 times a day as needed.     • aspirin 81 mg chewable tablet Chew 1 tablet (81 mg) once daily.     • atorvastatin (Lipitor) 80 mg tablet Take 1 tablet (80 mg) by mouth once daily at bedtime. 90 tablet 3   • BD Ultra-Fine Mini Pen Needle 31 gauge x 3/16\" needle USE WITH INSULIN TWICE DAILY     • cholecalciferol, vitamin D3, 250 mcg (10,000 unit) capsule Take by mouth once daily.     • Dexcom G7  misc USE AS DIRECTED TO TEST BLOOD SUGARS     • Dexcom G7 Sensor device USE AS DIRECTED TO TEST BLOOD SUGARS. CHANGE EVERY 10 DAYS.     • dilTIAZem ER (Tiazac) 180 mg 24 hr capsule Take 1 capsule (180 mg) by mouth once daily. 90 capsule 3   • Eliquis 5 " mg tablet Take 1 tablet (5 mg) by mouth 2 times a day. 180 tablet 3   • fluticasone (Flonase) 50 mcg/actuation nasal spray Administer into affected nostril(s).     • gabapentin (Neurontin) 600 mg tablet Take 1 tablet (600 mg) by mouth 3 times a day.     • guanFACINE (Intuniv) 4 mg 24 hr tablet      • HumaLOG U-100 Insulin 100 unit/mL injection INJECT 200 UNITS INTO OMNIPOD EVERY TWO DAYS     • lamoTRIgine (LaMICtal) 200 mg tablet Take 1 tablet (200 mg) by mouth once daily.     • lidocaine (Lidoderm) 5 % patch Apply topically.     • linaCLOtide (Linzess) 145 mcg capsule Take by mouth.     • metFORMIN (Glucophage) 1,000 mg tablet Take 1 tablet (1,000 mg) by mouth 2 times daily (morning and late afternoon).     • mirtazapine (Remeron) 30 mg tablet Take 1 tablet (30 mg) by mouth once daily at bedtime.     • omeprazole (PriLOSEC) 20 mg DR capsule Take 1 capsule (20 mg) by mouth once daily.     • Omnipod 5 G6-G7 Intro Kt,Gen5, cartridge REPLACE PODS EVERY 2-3 DAYS AS DIRECTED     • Omnipod 5 G6-G7 Pods, Gen 5, cartridge REPLACE PODS EVERY 2-3 DAYS AS DIRECTED     • OneTouch Ultra Test strip USE TO TEST BLOOD SUGAR TWICE DAILY AS DIRECTED     • OneTouch Ultra2 Meter misc USE THREE TIMES DAILY AS DIRECTED     • tiZANidine (Zanaflex) 4 mg tablet Take by mouth every 8 hours if needed.     • TRUEplus Insulin 1 mL 30 gauge x 5/16 syringe USE ONCE DAILY WITH INSULIN     • TRUEplus Lancets 28 gauge USE TWICE DAILY AS DIRECTED     • vitamins A,C,E-zinc-copper (PreserVision AREDS) 4,296 mcg-226 mg-90 mg capsule Take by mouth.       No current facility-administered medications for this visit.       PAST HISTORIES     PERTINENT PAST MEDICAL HISTORY: See HPI    PERTINENT PAST SURGICAL HISTORY for Sleep Medicine:  tonsillectomy and adenoidectomy    PERTINENT FAMILY HISTORY for Sleep Medicine:  Patient denies family history of sleep apnea.    PERTINENT SOCIAL HISTORY:  He  reports that he has never smoked. He has never used smokeless  "tobacco. No history on file for alcohol use and drug use. He currently lives alone and retired from work. Previous occupation was ER technician    Active Problems, Allergy List, Medication List, and PMH/PSH/FH/Social Hx have been reviewed and reconciled in chart. No significant changes unless documented in the pertinent chart section. Updates made when necessary.     PHYSICAL EXAM     VITAL SIGNS: /75   Pulse 62   Temp 35.7 °C (96.3 °F)   Resp 16   Ht 1.88 m (6' 2\")   Wt 111 kg (244 lb)   SpO2 97%   BMI 31.33 kg/m²     NECK CIRCUMFERENCE: 19 inches    ESS: 9  JOVANA: 27    Physical Exam  Constitutional: Awake, not in distress  Head: normocephalic, atraumatic  Craniofacial: no retrognathia  Sinus: no tenderness to palpation  Nose: + bilateral inferior turbinate hypertrophy, hard to breathe on either nostril  Dental: Class 1 bite, no overjet, + crossbite  Palate: Narrow and High-arched  Oropharynx: Kraft tongue position 4, Mallampati 3, lateral wall narrowing grade 4   Tonsils: surgically absent  Uvula: midline on phonation, not elongated, not swollen  Tongue: Midline on protrusion, no Tongue scalloping, + macroglossia  Lungs: Clear to auscultation bilateral, no rales  Heart: Regular rate and rhythm, no murmurs  Skin: Warm, no rash  Neuro: No tremors, moves all extremities  Psych: alert and oriented to time, place, and person    RESULTS/DATA     No results found for: \"IRON\", \"TRANSFERRIN\", \"IRONSAT\", \"TIBC\", \"FERRITIN\"    Bicarbonate   Date Value Ref Range Status   10/17/2024 29 21 - 32 mmol/L Final         ASSESSMENT/PLAN     Quinn Stokes is a 69 y.o. male who is seen today in [facility] for the following problems:.     SUSPECTED SLEEP APNEA  OBSTRUCTIVE SLEEP APNEA, *** positional ({EWssbaseline:49041} {EWrespiindices:40473}  SLEEP-RELATED HYPOXEMIA  {EWPLANFOROSAEVAL:68755}  {EWsplit&titratecomments:07933}  - Personally reviewed the sleep study's raw data such as " interpretation report, data sheet, and hypnogram. A copy of recent testing was either given to patient or released in [facility]. See HPI for detailed summary of sleep study results.  - Discussed sleep study results and treatment options with patient.  - Recommend starting auto-CPAP *** cm H2O with EPR ***, heated humidification, heated tubings, and mask used in sleep lab (***). Orders sent to {DME:65028}.  - Recommend starting auto-CPAP *** cm H2O with EPR ***, heated humidification, heated tubings, and mask fitting session. Patient is interested in trying the following mask: ***. Orders sent to {DME:52173}.  - Discussed 30-day mask guarantee and insurance requirement regarding PAP compliance and follow-up.   - Advised about cleaning instructions and replacement schedule of PAP accessories.  - Discussed sleep apnea in detail to include pathophysiology, risk factors, diagnostic options, treatment options, and cardiovascular / neurologic consequences of untreated AZAEL.   - Supportive management as follows: Lose weight. Stay off your back when sleeping. Avoid smoking, alcohol, and sedating medications if applicable. Don't drive when sleepy.    OBSTRUCTIVE SLEEP APNEA, *** positional ({EWssbaseline:26000} {EWrespiindices:72764}  SLEEP-RELATED HYPOXEMIA  - Now on auto-CPAP *** cm H2O and EPR ***  - Doing well with improved AZAEL symptoms.   - PAP adherence data reviewed today. Usage days ***/30, days> 4 hours at ***%, residual AHI ***.   {EWPAPFUPLANS:14495}  - Discussed sleep apnea in detail to include pathophysiology, risk factors, diagnostic options, treatment options, and cardiovascular / neurologic consequences of untreated AZAEL.   - Supportive management as follows: Lose weight. Stay off your back when sleeping. Avoid smoking, alcohol, and sedating medications if applicable. Don't drive when sleepy.    SEASONAL ALLERGIES / ALLERGIC RHINITIS / CHRONIC NASAL CONGESTION / POST-NASAL DRIP  - Start fluticasone nasal spray 2  sprays per nostril once daily 1 hour before bedtime.  - If there is inadequate or lack of improvement on the above intranasal steroid spray, consider adding Azelastine nasal spray, 2 sprays per nostril once daily in the morning.   - Alternatively, may add cetirizine or loratadine 10 mg once daily.   - Educated patient on proper use of intranasal sprays.     OBESITY / MORBID OBESITY  - Recommend weight loss with diet and exercise.  - Weight loss can help in long term management of AZAEL.  - Defer management to PCP.  - Will do preop risk evaluation once patient comes back with good PAP compliance.    HYPERTENSION  - BP stable today.  - BP slightly high today. Denies chest discomfort, shortness of breath, palpitations, headache, blurred vision, dizziness, or neurologic deficit.  - Untreated  or inadequately treated sleep apnea can lead to new onset hypertension, resistant hypertension, or refractory hypertension.  - Continue anti-hypertensive medications per PCP.  - Supportive management: low salt DASH diet (less than 2000 mg sodium intake daily), moderate intensity aerobic exercise at least 30 minutes 5 days per week, reduce stress, quit smoking, limit alcohol, lose weight, and monitor BP once daily  - PCP following. Defer management to PCP.    ATRIAL FIBRILLATION, ***  - currently in sinus rhythm / rate-controlled***  - Continue meds per Cardio.  - Cardio following. Defer management to Cardio.    {EWFOOW-UP:23959}    All of patient's questions were answered. He verbalizes understanding and agreement with my assessment and plan. Refer to after-visit-summary (AVS) for patient education and more details on sleep study preparation, troubleshooting issues with PAP usage, proper cleaning instructions of PAP supplies, and usual recommended replacement schedule for each of the PAP supplies.

## 2025-03-10 ENCOUNTER — TELEPHONE (OUTPATIENT)
Dept: CARDIOLOGY | Facility: HOSPITAL | Age: 70
End: 2025-03-10
Payer: COMMERCIAL

## 2025-03-10 NOTE — TELEPHONE ENCOUNTER
3/10/25  1115  Called patient; no answer. Left brief voice message informing of lab results and to call if questions.      ----- Message from Adalgisa Corrigan sent at 3/9/2025  9:19 AM EDT -----  Amio labs reviewed and were acceptable. Will ask RN to notify patient. No changes made.

## 2025-03-19 PROBLEM — Z86.39 HISTORY OF METABOLIC DISORDER: Status: RESOLVED | Noted: 2024-04-15 | Resolved: 2025-03-19

## 2025-03-19 NOTE — PATIENT INSTRUCTIONS
"Thank you for coming to the Sleep Medicine [facility] today! Your sleep medicine provider today was: Saumya Huddleston MD Below is a summary of your treatment plan, patient education, other important information, and our contact numbers.      TREATMENT PLAN     - Do the following testing: diagnostic in-lab sleep study and no split  - Please read the \"Patient Education\" section below for more detailed information. Try implementing tips, reminders, strategies, and supportive management.   - If not yet done, please sign up for [employer] to make a future schedule, send prescription requests, or send messages.    Follow-up Appointment:   Follow-up in 2-3 weeks after sleep study.    PATIENT EDUCATION     OBSTRUCTIVE SLEEP APNEA (AZAEL) is a sleep disorder where your upper airway muscles relax during sleep and the airway intermittently and repetitively narrows and collapses leading to partially blocked airway (hypopnea) or completely blocked airway (apnea) which, in turn, can disrupt breathing in sleep, lower oxygen levels while you sleep and cause night time wakings. Because both apnea and hypopnea may cause higher carbon dioxide or low oxygen levels, untreated AZAEL can lead to heart arrhythmia, elevation of blood pressure, and make it harder for the body to consolidate memory and facilitate metabolism (leading to higher blood sugars at night). Frequent partial arousals occur during sleep resulting in sleep deprivation and daytime sleepiness. AZAEL is associated with an increased risk of cardiovascular disease, stroke, hypertension, and insulin resistance. Moreover, untreated AZAEL with excessive daytime sleepiness can increase the risk of motor vehicular accidents.    Below are conservative strategies for AZAEL regardless of AZAEL severity are:   Positional therapy - Avoid sleeping on your back.   Healthy diet and regular exercise to optimize weight is highly encouraged.   Avoid alcohol late in the evening and sedative-hypnotics as these " substances can make sleep apnea worse.   Improve breathing through the nose with intranasal steroid spray, saline rinse, or antihistamines    Safety: Avoid driving vehicle and operating heavy equipment while sleepy. Drowsy driving may lead to life-threatening motor vehicle accidents. A person driving while sleepy is 5 times more likely to have an accident. If you feel sleepy, pull over and take a short power nap (sleep for less than 30 minutes). Otherwise, ask somebody to drive you.    Treatment options for sleep apnea include weight management, positional therapy, Positive Airway Therapy (PAP) therapy, oral appliance therapy, hypoglossal nerve stimulator (Inspire) and select airway surgeries.    Sleep Testing for sleep apnea: The best and ideal way to check out if you have sleep apnea is to do an overnight sleep study in the sleep laboratory. Alternatively, a home sleep apnea test can also be done depending on your insurance and risk factors.     If you are having a home sleep apnea test, kindly allot 1 hour during pickup of the testing kit as you will have to complete paperwork and listen to the sleep technician for in-person on-the-spot demonstration and instructions on how to hook up the testing kit at home. Do the test for 1 day and start off with sleeping on your back. If you sleep on your side in the middle of night or you have always been a side or stomach-sleeper, it is ok as long as you have some time on your back and off-back.     If you are having an overnight in-lab sleep study, please make sure to bring toiletries, a comfy pillow, additional warm blankets, and any nighttime medications (include as-needed inhaler, pain pill, etc) that you may regularly take. Also, be sure to eat dinner before you arrive as we generally do not provide meals inside the sleep testing center. Lastly, in order to fall asleep faster in the sleep testing center, we advise patients to wake up 2 hours earlier on the morning of  scheduled testing and avoid napping 2 days prior testing. Sometimes, your sleep provider may prescribe a sleep aid to be taken at lights out in the sleep testing center. If you are taking a sleep aid, consider having somebody pick you up after the sleep testing.    Overnight sleep studies may be scheduled on a weekday or weekend. We also perform daytime testing for shift workers on a case-by-case basis.    Once you have booked an appointment to do the sleep study, please contact my office for follow-up visit to discuss results.    On the other hand, if you have any of the following, please consider calling the sleep testing center to RESCHEDULE your sleep study appointment:  If you tested positive for COVID within 10 days of your sleep study appointment.  If you were exposed to somebody who was confirmed for COVID within 10 days of your sleep study appointment and now you are having symptoms of possible COVID  If you have fever>100F or any acute symptoms that you think will lead to poor sleep during testing (e.g. new or worsening stuffy nose not relieved by steroid nasal spray)  If you have traveled domestically or internationally in the last month and now you are having symptoms of possible COVID    You can also go to the following EDUCATION WEBSITES for further information:   American Academy of Sleep Medicine http://sleepeducation.org  National Sleep Foundation: https://sleepfoundation.org  American Sleep Apnea Association: https://www.sleepapnea.org (for patients with sleep apnea)  Narcolepsy Network: https://www.narcolepsynetwork.org (for patients with narcolepsy)  [employer]: https://www.wakeupnarcolepsy.org (for patients with narcolepsy)  Hypersomnia Foundation: https://www.hypersomniafoundation.org (for patients with idiopathic hypersomnia)  RLS foundation: https://www.rls.org (for patients with restless leg syndrome)    IMPORTANT INFORMATION     Call 911 for medical emergencies.  Our offices are  generally open from Monday-Friday, 8 am - 5 pm.   There are no supporting services by either the sleep doctors or their staff on weekends and Holidays, or after 5 PM on weekdays.   If you need to get in touch with me, you may either call my office number or you can use [employer].  If a referral for a test, for CPAP, or for another specialist was made, and you have not heard about scheduling this within a week, please call scheduling at [phone] ([phone]).  If you are unable to make your appointment for clinic or an overnight study, kindly call the office or sleep testing center at least 48 hours in advance to cancel and reschedule.  If you are on CPAP, please bring your device's card and/or the device to each clinic appointment.   In case of problems with PAP machine or mask interface, please contact your [employer] company first. [employer] is the company who provides you the machine and/or PAP supplies.       PRESCRIPTIONS     We require 7 days advanced notice for prescription refills. If we do not receive the request in this time, we cannot guarantee that your medication will be refilled in time.    IMPORTANT PHONE NUMBERS     Sleep Medicine Clinic Fax: [phone]  Appointments (for Pediatric Sleep Clinic): [phone] - option 1  Appointments (for Adult Sleep Clinic): [phone] - option 2  Appointments (For Sleep Studies): [phone] - option 3  Behavioral Sleep Medicine: [phone]  Sleep Surgery: [phone]  Nutrition Service: [phone]  Weight management clinics with endocrinology: [phone]  Bariatric Services: [phone] (includes weight loss medications and weight loss surgery)  [facility]: [phone] (offers holistic approaches to weight management)  ENT (Otolaryngology): [phone]  Headache Clinic (Neurology): [phone]  Neurology: [phone]  Psychiatry: [phone]  Pulmonary Function Testing (PFT) Center:  [phone]  Pulmonary Medicine: [phone]  Medical Service [employer]): [phone]      OUR SLEEP TESTING LOCATIONS     Our team will contact you to schedule your sleep study, however, you can contact us as follow:  Main Phone Line (scheduling only): [phone]), option 3  Adult and Pediatric Locations  [facility] (6 years and older): [facility] - 4th floor ([address]) After hours line: [phone]  [facility] (Main campus: All ages): [facility], 6th floor. After hours line: [phone]   [place] (5 years and older; younger considered on case-by-case basis): [address]; [address], [address]. Scheduling  After hours line: [phone]   [place] (6 years and older): [address]; [address]; [address]   [place] (6 years and older): [address]  After hours line: [phone]   [place] (13 years and older) in [place]: [address], 2nd floor  After hours line: [phone]  [facility] (13 year and older): [address]  After hours line: [phone]     Adult Only Locations:   [place] (18 years and older): [address] [facility] [address] [facility], 2nd floor   [place] (18 years and older): [address] [facility] [facility]; 4th floor  After hours line: [phone]  [facility] (18 years and older) at [place]: [address] [facility]  After hours line: [phone]     CONTACTING YOUR SLEEP MEDICINE PROVIDER AND SLEEP TEAM      For issues with your machine or mask interface, please call your DME provider first. [employer] for durable medical company. [employer] is the company who provides you the machine and/or PAP supplies / accessories.   To schedule, cancel, or reschedule SLEEP STUDY APPOINTMENTS, please call the Main Phone Line at [phone] - option 3.   To schedule, cancel, or reschedule CLINIC APPOINTMENTS, you can do it in \"MyChart\", call [phone] " "(direct line of [facility]), call [phone] (direct line of [facility]), or call the Main Phone Line at [phone] - option 2  For CLINICAL QUESTIONS or MEDICATION REFILLS, please call direct line for Adult Sleep Nurses at [phone].   Lastly, you can also send a message directly to your provider through \"My Chart\", which is the email service through your  Records Account: [email or web address]       Here at [facility], we wish you a restful sleep!    "

## 2025-04-09 ENCOUNTER — HOSPITAL ENCOUNTER (OUTPATIENT)
Dept: VASCULAR MEDICINE | Facility: HOSPITAL | Age: 70
Discharge: HOME | End: 2025-04-09
Payer: COMMERCIAL

## 2025-04-09 DIAGNOSIS — I73.9 PERIPHERAL VASCULAR DISEASE, UNSPECIFIED (CMS-HCC): ICD-10-CM

## 2025-04-09 PROCEDURE — 93922 UPR/L XTREMITY ART 2 LEVELS: CPT | Performed by: SURGERY

## 2025-04-09 PROCEDURE — 93922 UPR/L XTREMITY ART 2 LEVELS: CPT

## 2025-04-22 ENCOUNTER — CLINICAL SUPPORT (OUTPATIENT)
Dept: SLEEP MEDICINE | Facility: CLINIC | Age: 70
End: 2025-04-22
Payer: COMMERCIAL

## 2025-04-22 VITALS
WEIGHT: 244.71 LBS | HEIGHT: 74 IN | BODY MASS INDEX: 31.41 KG/M2 | SYSTOLIC BLOOD PRESSURE: 116 MMHG | DIASTOLIC BLOOD PRESSURE: 75 MMHG

## 2025-04-22 DIAGNOSIS — G47.33 OSA (OBSTRUCTIVE SLEEP APNEA): ICD-10-CM

## 2025-04-22 DIAGNOSIS — Z78.9 INTOLERANCE OF CONTINUOUS POSITIVE AIRWAY PRESSURE (CPAP) VENTILATION: ICD-10-CM

## 2025-04-23 NOTE — PROGRESS NOTES
[facility] TECH NOTE:     Patient: Quinn Stokes   MRN//AGE: [id number]  1955  70 y.o.   Technologist: SOTO Malik   Room: 3   Service Date: 2025        Sleep Testing Location: [place].    [place]: 9    TECHNOLOGIST SLEEP STUDY PROCEDURE NOTE:   This sleep study is being conducted according to the policies and procedures outlined by the AAS accreditation standards.      The patient is a 70 y.o. year old male scheduled for aDiagnostic PSG Split night with montage of:  standard .   He arrived to the lab by himself.  The procedure was explained to the patient, his questions were answered and he expressed understanding.     The study that was ultimately completed was aDiagnostic PSG Split night with montage of:  standard .    The full study Was completed.    Patient questionnaires completed?: yes     Consents signed? yes    Initial Fall Risk Screening:     Quinn has not fallen in the last 6 months. his did not result in injury. Quinn does not have a fear of falling. He does not need assistance with sitting, standing, or walking. he does not need assistance walking in his home. he does not need assistance in an unfamiliar setting. The patient is using a walker.    Brief Study observations: The patient was very tired and he kept falling asleep during the set up.  He was asleep prior to Lights out, so biocals were not done.  The study was started with the head of bed up about 20 degrees.  He is being studied for Inspire eligibility.    Deviation to order/protocol and reason: NA      If PAP, which was preferred mask/pressure/mode: NA      Other:None    After the procedure, the patient/family was informed to ensure followup with ordering clinician for testing results.      Technologist: SOTO Malik

## 2025-04-26 LAB
CHOLEST SERPL-MCNC: 240 MG/DL
CHOLEST/HDLC SERPL: 6.2 (CALC)
HDLC SERPL-MCNC: 39 MG/DL
LDLC SERPL CALC-MCNC: 173 MG/DL (CALC)
NONHDLC SERPL-MCNC: 201 MG/DL (CALC)
TRIGL SERPL-MCNC: 138 MG/DL

## 2025-08-13 ENCOUNTER — TELEPHONE (OUTPATIENT)
Dept: CARDIOLOGY | Facility: HOSPITAL | Age: 70
End: 2025-08-13
Payer: COMMERCIAL

## 2025-08-17 ASSESSMENT — ENCOUNTER SYMPTOMS
ORTHOPNEA: 0
CONSTITUTIONAL NEGATIVE: 1
MEMORY LOSS: 0
SYNCOPE: 0
RESPIRATORY NEGATIVE: 1
LIGHT-HEADEDNESS: 0
IRREGULAR HEARTBEAT: 0
BLURRED VISION: 0
DECREASED APPETITE: 0
BACK PAIN: 1
PALPITATIONS: 0
GASTROINTESTINAL NEGATIVE: 1
FALLS: 0
SHORTNESS OF BREATH: 0
COUGH: 0
DYSPNEA ON EXERTION: 0
FOCAL WEAKNESS: 0
DEPRESSION: 0

## 2025-08-21 ENCOUNTER — OFFICE VISIT (OUTPATIENT)
Dept: CARDIOLOGY | Facility: HOSPITAL | Age: 70
End: 2025-08-21
Payer: MEDICARE

## 2025-08-21 VITALS
SYSTOLIC BLOOD PRESSURE: 108 MMHG | DIASTOLIC BLOOD PRESSURE: 68 MMHG | WEIGHT: 249 LBS | BODY MASS INDEX: 31.96 KG/M2 | HEART RATE: 65 BPM

## 2025-08-21 DIAGNOSIS — I10 HYPERTENSION, BENIGN: ICD-10-CM

## 2025-08-21 DIAGNOSIS — E78.5 DYSLIPIDEMIA: Primary | ICD-10-CM

## 2025-08-21 DIAGNOSIS — I25.10 CORONARY ARTERY DISEASE INVOLVING NATIVE CORONARY ARTERY OF NATIVE HEART WITHOUT ANGINA PECTORIS: ICD-10-CM

## 2025-08-21 DIAGNOSIS — Z79.899 HIGH RISK MEDICATION USE: ICD-10-CM

## 2025-08-21 DIAGNOSIS — I48.0 PAROXYSMAL ATRIAL FIBRILLATION (MULTI): ICD-10-CM

## 2025-08-21 PROCEDURE — 3074F SYST BP LT 130 MM HG: CPT | Performed by: NURSE PRACTITIONER

## 2025-08-21 PROCEDURE — 3078F DIAST BP <80 MM HG: CPT | Performed by: NURSE PRACTITIONER

## 2025-08-21 PROCEDURE — 99213 OFFICE O/P EST LOW 20 MIN: CPT

## 2025-08-21 PROCEDURE — 99214 OFFICE O/P EST MOD 30 MIN: CPT | Performed by: NURSE PRACTITIONER

## 2025-08-21 PROCEDURE — 93005 ELECTROCARDIOGRAM TRACING: CPT | Performed by: NURSE PRACTITIONER

## 2025-08-21 PROCEDURE — 1159F MED LIST DOCD IN RCRD: CPT | Performed by: NURSE PRACTITIONER

## 2025-08-21 PROCEDURE — 1160F RVW MEDS BY RX/DR IN RCRD: CPT | Performed by: NURSE PRACTITIONER

## 2025-08-21 PROCEDURE — 1036F TOBACCO NON-USER: CPT | Performed by: NURSE PRACTITIONER

## 2025-08-21 RX ORDER — EZETIMIBE 10 MG/1
10 TABLET ORAL DAILY
Qty: 90 TABLET | Refills: 3 | Status: SHIPPED | OUTPATIENT
Start: 2025-08-21 | End: 2026-08-21

## 2025-08-21 RX ORDER — APIXABAN 5 MG/1
5 TABLET, FILM COATED ORAL 2 TIMES DAILY
Qty: 180 TABLET | Refills: 3 | Status: SHIPPED | OUTPATIENT
Start: 2025-08-21 | End: 2026-08-21

## 2025-08-26 ENCOUNTER — APPOINTMENT (OUTPATIENT)
Dept: CARDIOLOGY | Facility: HOSPITAL | Age: 70
End: 2025-08-26
Payer: COMMERCIAL

## 2025-08-28 ENCOUNTER — OFFICE VISIT (OUTPATIENT)
Dept: SLEEP MEDICINE | Facility: CLINIC | Age: 70
End: 2025-08-28
Payer: MEDICARE

## 2025-08-28 VITALS — BODY MASS INDEX: 31.95 KG/M2 | WEIGHT: 249 LBS | HEIGHT: 74 IN

## 2025-08-28 DIAGNOSIS — Z78.9 INTOLERANCE OF CONTINUOUS POSITIVE AIRWAY PRESSURE (CPAP) VENTILATION: ICD-10-CM

## 2025-08-28 DIAGNOSIS — G47.33 OSA (OBSTRUCTIVE SLEEP APNEA): Primary | ICD-10-CM

## 2025-08-28 PROCEDURE — 99214 OFFICE O/P EST MOD 30 MIN: CPT | Performed by: INTERNAL MEDICINE

## 2025-08-28 PROCEDURE — 1159F MED LIST DOCD IN RCRD: CPT | Performed by: INTERNAL MEDICINE

## 2025-08-28 PROCEDURE — 3008F BODY MASS INDEX DOCD: CPT | Performed by: INTERNAL MEDICINE

## 2025-08-28 PROCEDURE — 99214 OFFICE O/P EST MOD 30 MIN: CPT | Mod: GT,95 | Performed by: INTERNAL MEDICINE

## 2025-08-28 PROCEDURE — 1160F RVW MEDS BY RX/DR IN RCRD: CPT | Performed by: INTERNAL MEDICINE

## 2025-08-28 ASSESSMENT — SLEEP AND FATIGUE QUESTIONNAIRES
ESS-CHAD TOTAL SCORE: 12
SITING INACTIVE IN A PUBLIC PLACE LIKE A CLASS ROOM OR A MOVIE THEATER: SLIGHT CHANCE OF DOZING
HOW LIKELY ARE YOU TO NOD OFF OR FALL ASLEEP WHILE SITTING INACTIVE IN A PUBLIC PLACE: SLIGHT CHANCE OF DOZING
HOW LIKELY ARE YOU TO NOD OFF OR FALL ASLEEP WHILE SITTING AND READING: MODERATE CHANCE OF DOZING
HOW LIKELY ARE YOU TO NOD OFF OR FALL ASLEEP IN A CAR, WHILE STOPPED FOR A FEW MINUTES IN TRAFFIC: WOULD NEVER DOZE
HOW LIKELY ARE YOU TO NOD OFF OR FALL ASLEEP WHILE LYING DOWN TO REST IN THE AFTERNOON WHEN CIRCUMSTANCES PERMIT: HIGH CHANCE OF DOZING
HOW LIKELY ARE YOU TO NOD OFF OR FALL ASLEEP WHEN YOU ARE A PASSENGER IN A CAR FOR AN HOUR WITHOUT A BREAK: SLIGHT CHANCE OF DOZING
HOW LIKELY ARE YOU TO NOD OFF OR FALL ASLEEP WHILE SITTING AND TALKING TO SOMEONE: SLIGHT CHANCE OF DOZING
HOW LIKELY ARE YOU TO NOD OFF OR FALL ASLEEP WHILE SITTING QUIETLY AFTER LUNCH WITHOUT ALCOHOL: MODERATE CHANCE OF DOZING
HOW LIKELY ARE YOU TO NOD OFF OR FALL ASLEEP IN A CAR, WHILE STOPPED FOR A FEW MINUTES IN TRAFFIC: WOULD NEVER DOZE
ESS TOTAL SCORE: 12
HOW LIKELY ARE YOU TO NOD OFF OR FALL ASLEEP WHILE WATCHING TV: MODERATE CHANCE OF DOZING

## 2025-08-28 ASSESSMENT — ENCOUNTER SYMPTOMS
LOSS OF SENSATION IN FEET: 0
OCCASIONAL FEELINGS OF UNSTEADINESS: 1
DEPRESSION: 1

## 2025-08-28 ASSESSMENT — PATIENT HEALTH QUESTIONNAIRE - PHQ9
5. POOR APPETITE OR OVEREATING: NOT AT ALL
3. TROUBLE FALLING OR STAYING ASLEEP OR SLEEPING TOO MUCH: MORE THAN HALF THE DAYS
SUM OF ALL RESPONSES TO PHQ9 QUESTIONS 1 AND 2: 2
SUM OF ALL RESPONSES TO PHQ QUESTIONS 1-9: 8
2. FEELING DOWN, DEPRESSED OR HOPELESS: SEVERAL DAYS
8. MOVING OR SPEAKING SO SLOWLY THAT OTHER PEOPLE COULD HAVE NOTICED. OR THE OPPOSITE, BEING SO FIGETY OR RESTLESS THAT YOU HAVE BEEN MOVING AROUND A LOT MORE THAN USUAL: NOT AT ALL
1. LITTLE INTEREST OR PLEASURE IN DOING THINGS: SEVERAL DAYS
6. FEELING BAD ABOUT YOURSELF - OR THAT YOU ARE A FAILURE OR HAVE LET YOURSELF OR YOUR FAMILY DOWN: NOT AT ALL
9. THOUGHTS THAT YOU WOULD BE BETTER OFF DEAD, OR OF HURTING YOURSELF: NOT AT ALL
7. TROUBLE CONCENTRATING ON THINGS, SUCH AS READING THE NEWSPAPER OR WATCHING TELEVISION: SEVERAL DAYS
4. FEELING TIRED OR HAVING LITTLE ENERGY: NEARLY EVERY DAY